# Patient Record
Sex: MALE | Race: ASIAN | NOT HISPANIC OR LATINO | URBAN - METROPOLITAN AREA
[De-identification: names, ages, dates, MRNs, and addresses within clinical notes are randomized per-mention and may not be internally consistent; named-entity substitution may affect disease eponyms.]

---

## 2018-10-02 ENCOUNTER — INPATIENT (INPATIENT)
Facility: HOSPITAL | Age: 48
LOS: 7 days | Discharge: HOME | DRG: 25 | End: 2018-10-10
Attending: NEUROLOGICAL SURGERY | Admitting: INTERNAL MEDICINE
Payer: MEDICAID

## 2018-10-02 VITALS
HEART RATE: 95 BPM | SYSTOLIC BLOOD PRESSURE: 132 MMHG | TEMPERATURE: 98 F | OXYGEN SATURATION: 98 % | DIASTOLIC BLOOD PRESSURE: 94 MMHG | RESPIRATION RATE: 20 BRPM

## 2018-10-02 DIAGNOSIS — R91.8 OTHER NONSPECIFIC ABNORMAL FINDING OF LUNG FIELD: ICD-10-CM

## 2018-10-02 LAB
ALBUMIN SERPL ELPH-MCNC: 4.3 G/DL — SIGNIFICANT CHANGE UP (ref 3.3–5)
ALP SERPL-CCNC: 93 U/L — SIGNIFICANT CHANGE UP (ref 40–120)
ALT FLD-CCNC: 22 U/L — SIGNIFICANT CHANGE UP (ref 10–45)
ANION GAP SERPL CALC-SCNC: 10 MMOL/L — SIGNIFICANT CHANGE UP (ref 5–17)
APTT BLD: 39.7 SEC — HIGH (ref 27.5–37.4)
AST SERPL-CCNC: 15 U/L — SIGNIFICANT CHANGE UP (ref 10–40)
BASOPHILS # BLD AUTO: 0.1 K/UL — SIGNIFICANT CHANGE UP (ref 0–0.2)
BASOPHILS NFR BLD AUTO: 0.6 % — SIGNIFICANT CHANGE UP (ref 0–2)
BILIRUB SERPL-MCNC: 0.3 MG/DL — SIGNIFICANT CHANGE UP (ref 0.2–1.2)
BUN SERPL-MCNC: 9 MG/DL — SIGNIFICANT CHANGE UP (ref 7–23)
CALCIUM SERPL-MCNC: 9.6 MG/DL — SIGNIFICANT CHANGE UP (ref 8.4–10.5)
CHLORIDE SERPL-SCNC: 105 MMOL/L — SIGNIFICANT CHANGE UP (ref 96–108)
CK MB BLD-MCNC: 1.7 % — SIGNIFICANT CHANGE UP (ref 0–3.5)
CK MB CFR SERPL CALC: 1.7 NG/ML — SIGNIFICANT CHANGE UP (ref 0–6.7)
CK SERPL-CCNC: 100 U/L — SIGNIFICANT CHANGE UP (ref 30–200)
CO2 SERPL-SCNC: 22 MMOL/L — SIGNIFICANT CHANGE UP (ref 22–31)
CREAT SERPL-MCNC: 0.7 MG/DL — SIGNIFICANT CHANGE UP (ref 0.5–1.3)
EOSINOPHIL # BLD AUTO: 0.3 K/UL — SIGNIFICANT CHANGE UP (ref 0–0.5)
EOSINOPHIL NFR BLD AUTO: 2.7 % — SIGNIFICANT CHANGE UP (ref 0–6)
GAS PNL BLDV: SIGNIFICANT CHANGE UP
GLUCOSE BLDC GLUCOMTR-MCNC: 194 MG/DL — HIGH (ref 70–99)
GLUCOSE SERPL-MCNC: 224 MG/DL — HIGH (ref 70–99)
HCT VFR BLD CALC: 47.9 % — SIGNIFICANT CHANGE UP (ref 39–50)
HGB BLD-MCNC: 15.5 G/DL — SIGNIFICANT CHANGE UP (ref 13–17)
INR BLD: 1.31 RATIO — HIGH (ref 0.88–1.16)
LYMPHOCYTES # BLD AUTO: 3.4 K/UL — HIGH (ref 1–3.3)
LYMPHOCYTES # BLD AUTO: 31.9 % — SIGNIFICANT CHANGE UP (ref 13–44)
MCHC RBC-ENTMCNC: 26.6 PG — LOW (ref 27–34)
MCHC RBC-ENTMCNC: 32.5 GM/DL — SIGNIFICANT CHANGE UP (ref 32–36)
MCV RBC AUTO: 81.9 FL — SIGNIFICANT CHANGE UP (ref 80–100)
MONOCYTES # BLD AUTO: 0.6 K/UL — SIGNIFICANT CHANGE UP (ref 0–0.9)
MONOCYTES NFR BLD AUTO: 5.3 % — SIGNIFICANT CHANGE UP (ref 2–14)
NEUTROPHILS # BLD AUTO: 6.4 K/UL — SIGNIFICANT CHANGE UP (ref 1.8–7.4)
NEUTROPHILS NFR BLD AUTO: 59.4 % — SIGNIFICANT CHANGE UP (ref 43–77)
NT-PROBNP SERPL-SCNC: 5 PG/ML — SIGNIFICANT CHANGE UP (ref 0–300)
PLATELET # BLD AUTO: 211 K/UL — SIGNIFICANT CHANGE UP (ref 150–400)
POTASSIUM SERPL-MCNC: 4.4 MMOL/L — SIGNIFICANT CHANGE UP (ref 3.5–5.3)
POTASSIUM SERPL-SCNC: 4.4 MMOL/L — SIGNIFICANT CHANGE UP (ref 3.5–5.3)
PROT SERPL-MCNC: 7.4 G/DL — SIGNIFICANT CHANGE UP (ref 6–8.3)
PROTHROM AB SERPL-ACNC: 14.4 SEC — HIGH (ref 9.8–12.7)
RBC # BLD: 5.85 M/UL — HIGH (ref 4.2–5.8)
RBC # FLD: 13 % — SIGNIFICANT CHANGE UP (ref 10.3–14.5)
SODIUM SERPL-SCNC: 137 MMOL/L — SIGNIFICANT CHANGE UP (ref 135–145)
TROPONIN T, HIGH SENSITIVITY RESULT: 6 NG/L — SIGNIFICANT CHANGE UP (ref 0–51)
WBC # BLD: 10.8 K/UL — HIGH (ref 3.8–10.5)
WBC # FLD AUTO: 10.8 K/UL — HIGH (ref 3.8–10.5)

## 2018-10-02 PROCEDURE — 70498 CT ANGIOGRAPHY NECK: CPT | Mod: 26

## 2018-10-02 PROCEDURE — 99285 EMERGENCY DEPT VISIT HI MDM: CPT | Mod: 25

## 2018-10-02 PROCEDURE — 74177 CT ABD & PELVIS W/CONTRAST: CPT | Mod: 26

## 2018-10-02 PROCEDURE — 71045 X-RAY EXAM CHEST 1 VIEW: CPT | Mod: 26

## 2018-10-02 PROCEDURE — 70450 CT HEAD/BRAIN W/O DYE: CPT | Mod: 26,59

## 2018-10-02 PROCEDURE — 93010 ELECTROCARDIOGRAM REPORT: CPT | Mod: 76

## 2018-10-02 PROCEDURE — 71260 CT THORAX DX C+: CPT | Mod: 26

## 2018-10-02 PROCEDURE — 70496 CT ANGIOGRAPHY HEAD: CPT | Mod: 26

## 2018-10-02 RX ORDER — LISINOPRIL 2.5 MG/1
20 TABLET ORAL DAILY
Qty: 0 | Refills: 0 | Status: DISCONTINUED | OUTPATIENT
Start: 2018-10-02 | End: 2018-10-05

## 2018-10-02 RX ORDER — GEMFIBROZIL 600 MG
600 TABLET ORAL ONCE
Qty: 0 | Refills: 0 | Status: COMPLETED | OUTPATIENT
Start: 2018-10-02 | End: 2018-10-02

## 2018-10-02 RX ADMIN — Medication 600 MILLIGRAM(S): at 22:33

## 2018-10-02 RX ADMIN — Medication 100 MILLIGRAM(S): at 22:33

## 2018-10-02 NOTE — ED ADULT NURSE REASSESSMENT NOTE - NS ED NURSE REASSESS COMMENT FT1
Patient received bed assignment. Patient aware of assignment. Report given to receiving RN. VSS. Patient stable for transport. Chart given to charge desk. Safety and comfort maintained while in ED.

## 2018-10-02 NOTE — ED PROVIDER NOTE - OBJECTIVE STATEMENT
49 yo M w/ PMHX of diabetes, HTN, HLD, and seizures diagnosed in 2007 (reports only having 1 episode) presents today after a fall today at  max while out with his family. Pt reports feeling lightheaded, falling, and losing consciousness for about 15 minutes and woke up confused as per family. The fall happened 1 hour ago. Prior to the fall pt admits to having intermittent CP, muscle pains in legs BL, severe HA which was accompanied by dizziness and blurry vision. Denies any CP, nausea, SOB, or hx of heart attacks. Finger stick done by EMS was 120. 47 yo M w/ PMHX of diabetes, HTN, HLD, and seizures diagnosed in 2007 (reports only having 1 episode) at the same time as his diabetes. Presents today after a fall today at [employer] while out with his family. Pt reports feeling lightheaded, falling, and losing consciousness for about 5-10 minutes and woke up confused and sweaty as per family. The fall happened 1 hour ago. Prior to the fall pt admits to having muscle pains in legs BL, severe HA which was accompanied by dizziness and blurry vision. Denies any CP, nausea, SOB, or hx of heart attacks. Finger stick done by EMS was 120.    Pt is somewhat slow of speech but speaking clearly. A&Ox4 with slightly depressed affect. family present and states he hasn't been himself for several days and noted a shuffling/abnormal gait which started on Saturday. Full neuro exam today, afocal. However, report of sudden onset severe HA associated with diaphoresis and syncope with reported LOC 5-10 mins concerned for subarachnoid vs seizure vs ACS, EKG and fingerstick non-actionable on arrival.

## 2018-10-02 NOTE — H&P ADULT - NSHPLABSRESULTS_GEN_ALL_CORE
LABS:                        15.5   10.8  )-----------( 211      ( 02 Oct 2018 13:44 )             47.9     10-02    137  |  105  |  9   ----------------------------<  224<H>  4.4   |  22  |  0.70    Ca    9.6      02 Oct 2018 13:44    TPro  7.4  /  Alb  4.3  /  TBili  0.3  /  DBili  x   /  AST  15  /  ALT  22  /  AlkPhos  93  10-02    PT/INR - ( 02 Oct 2018 13:44 )   PT: 14.4 sec;   INR: 1.31 ratio         PTT - ( 02 Oct 2018 13:44 )  PTT:39.7 sec        RADIOLOGY & ADDITIONAL TESTS:

## 2018-10-02 NOTE — H&P ADULT - NSHPPHYSICALEXAM_GEN_ALL_CORE
PHYSICAL EXAMINATION:  Vital Signs Last 24 Hrs  T(C): 36.8 (02 Oct 2018 19:55), Max: 36.9 (02 Oct 2018 13:07)  T(F): 98.2 (02 Oct 2018 19:55), Max: 98.4 (02 Oct 2018 13:07)  HR: 70 (02 Oct 2018 19:55) (70 - 95)  BP: 131/85 (02 Oct 2018 19:55) (131/85 - 138/88)  BP(mean): --  RR: 18 (02 Oct 2018 19:55) (16 - 20)  SpO2: 97% (02 Oct 2018 19:55) (97% - 98%)  CAPILLARY BLOOD GLUCOSE      POCT Blood Glucose.: 199 mg/dL (02 Oct 2018 13:35)      GENERAL: NAD, well-groomed, well-developed  HEAD:  atraumatic, normocephalic  EYES: sclera anicteric  ENMT: mucous membranes moist  NECK: supple, No JVD  CHEST/LUNG: clear to auscultation bilaterally; no rales, rhonchi, or wheezing b/l  HEART: normal S1, S2  ABDOMEN: BS+, soft, ND, NT   EXTREMITIES:  pulses palpable; no clubbing, cyanosis, or edema b/l LEs  NEURO: awake, alert, interactive; moves all extremities  SKIN: no rashes or lesions PHYSICAL EXAMINATION:  Vital Signs Last 24 Hrs  T(C): 36.8 (02 Oct 2018 19:55), Max: 36.9 (02 Oct 2018 13:07)  T(F): 98.2 (02 Oct 2018 19:55), Max: 98.4 (02 Oct 2018 13:07)  HR: 70 (02 Oct 2018 19:55) (70 - 95)  BP: 131/85 (02 Oct 2018 19:55) (131/85 - 138/88)  BP(mean): --  RR: 18 (02 Oct 2018 19:55) (16 - 20)  SpO2: 97% (02 Oct 2018 19:55) (97% - 98%)  CAPILLARY BLOOD GLUCOSE      POCT Blood Glucose.: 199 mg/dL (02 Oct 2018 13:35)      GENERAL: NAD, well-groomed, well-developed, comfortable on 8 [address]  HEAD:  atraumatic, normocephalic  EYES: sclera anicteric  ENMT: mucous membranes moist  NECK: supple, No JVD  CHEST/LUNG: clear to auscultation bilaterally; no rales, rhonchi, or wheezing b/l  HEART: normal S1, S2  ABDOMEN: BS+, soft, ND, NT   EXTREMITIES:  pulses palpable; no clubbing, cyanosis, or edema b/l LEs  NEURO: awake, alert, interactive; moves all extremities  SKIN: no rashes or lesions

## 2018-10-02 NOTE — ED ADULT NURSE NOTE - OBJECTIVE STATEMENT
48 y m came to the ed by ems c/o dizziness. patient states the dizziness started suddenly. denies any chest pain, sob. c/o headache. states he was worked up at another hospital one month ago and was told nothing wrong. patient is a/xo3. skin is warm and dry. denies any n/v/d.

## 2018-10-02 NOTE — ED PROVIDER NOTE - ATTENDING CONTRIBUTION TO CARE
Attending MD Arriola.  Pt seen by myself and PA in real time with scribe assistance in documentation.  Received call from radiology re: broad/significant encephalomalacea and lung nodules.  No previously known CA hx.  Radiology recommending MRI with/without.  Neurology consulted and recommending the same.  Stable for admission to medicine.

## 2018-10-02 NOTE — CONSULT NOTE ADULT - SUBJECTIVE AND OBJECTIVE BOX
Neurology Consult    Reason for consult: Encephalomalacia on CT    HPI: Patient is a 48 year old Russian male presenting s/p syncope. Patient has PMH of HTN, DM, seizures (since age 10, last seizure was 2007, on dilantin 100 QHS). Patient states he was walking at a store earlier when he felt lightheaded and passed out. Denies any urinary incontinence, jerking movements, tongue biting. States he had multiple episodes of syncope in the past. He is visiting from [facility]. Had CTH 1 year ago and states he had some scarring but does not remember the details.     REVIEW OF SYSTEMS:  Constitutional: No fever, chills, fatigue, weakness.  Eyes: No eye pain, visual disturbances, or discharge.  ENT:  No difficulty hearing, tinnitus, vertigo. No sinus or throat pain.  Neck: No pain or stiffness.  Respiratory: No cough, dyspnea, wheezing.  Cardiovascular: No chest pain, palpitations.  Gastrointestinal: No abdominal pain. No nausea, vomiting, diarrhea, or constipation.   Genitourinary: No dysuria, frequency, hematuria or incontinence.  Neurological: No headaches, lightheadedness, vertigo, numbness or tremors.  Psychiatric: No depression, anxiety, mood swings, or difficulty sleeping.  Musculoskeletal: No joint pain or swelling. No muscle, back, or extremity pain.  Skin: No itching, burning, rashes or lesions.   Lymph Nodes: No enlarged glands  Endocrine: No heat or cold intolerance, no hair loss.  Allergy and Immunologic: No hives or eczema.    MEDICATIONS  Dilantin 100 QHS    PMH:      PSH:     FAMILY HISTORY:  No history of dementia, strokes    SOCIAL HISTORY:  Smokes and drinks     Allergies  No Known Allergies    Vital Signs Last 24 Hrs  T(C): 36.9 (02 Oct 2018 16:04), Max: 36.9 (02 Oct 2018 13:07)  T(F): 98.4 (02 Oct 2018 16:04), Max: 98.4 (02 Oct 2018 13:07)  HR: 73 (02 Oct 2018 16:04) (73 - 95)  BP: 135/89 (02 Oct 2018 16:04) (132/94 - 138/88)  RR: 18 (02 Oct 2018 16:04) (16 - 20)  SpO2: 98% (02 Oct 2018 16:04) (97% - 98%)    Neurological Examination:    Mental Status: Patient is alert, awake, oriented X3. Patient is fluent, no dysarthria, no aphasia. Follows commands well and able to answer questions appropriately. Mood and affect normal.    Cranial Nerves: PERRL, EOMI, visual field intact, V1-V3 intact, mild right nasolabial flattening, tongue/uvula midline    Motor Exam: No drift  Right upper extremity: 5/5  Left upper extremity: 5/5  Right lower extremity: 5/5  Left lower extremity: 5/5    Normal bulk/tone    Sensory: Intact to light touch bilaterally. No extinction    Coordination: Finger to nose intact bilaterally     Reflexes: Bilateral 2+ Biceps, Brachial, Patellar    GENERAL: No acute distress  HEENT:  Normocephalic, atraumatic  EXTREMITIES: No edema, clubbing, cyanosis  MUSCULOSKELETAL: Normal range of motion  SKIN: No rashes    LABS:  CBC Full  -  ( 02 Oct 2018 13:44 )  WBC Count : 10.8 K/uL  Hemoglobin : 15.5 g/dL  Hematocrit : 47.9 %  Platelet Count - Automated : 211 K/uL  Mean Cell Volume : 81.9 fl  Mean Cell Hemoglobin : 26.6 pg  Mean Cell Hemoglobin Concentration : 32.5 gm/dL  Auto Neutrophil # : 6.4 K/uL  Auto Lymphocyte # : 3.4 K/uL  Auto Monocyte # : 0.6 K/uL  Auto Eosinophil # : 0.3 K/uL  Auto Basophil # : 0.1 K/uL  Auto Neutrophil % : 59.4 %  Auto Lymphocyte % : 31.9 %  Auto Monocyte % : 5.3 %  Auto Eosinophil % : 2.7 %  Auto Basophil % : 0.6 %    10-02    137  |  105  |  9   ----------------------------<  224<H>  4.4   |  22  |  0.70    Ca    9.6      02 Oct 2018 13:44    TPro  7.4  /  Alb  4.3  /  TBili  0.3  /  DBili  x   /  AST  15  /  ALT  22  /  AlkPhos  93  10-02    LIVER FUNCTIONS - ( 02 Oct 2018 13:44 )  Alb: 4.3 g/dL / Pro: 7.4 g/dL / ALK PHOS: 93 U/L / ALT: 22 U/L / AST: 15 U/L / GGT: x           Hemoglobin A1C:       PT/INR - ( 02 Oct 2018 13:44 )   PT: 14.4 sec;   INR: 1.31 ratio         PTT - ( 02 Oct 2018 13:44 )  PTT:39.7 sec

## 2018-10-02 NOTE — H&P ADULT - ASSESSMENT
49 yo M w/ PMHX of diabetes, HTN, HLD, and seizures diagnosed in 2007 (reports only having 1 episode) at the same time as his diabetes. Presents today after a fall today at [employer] while out with his family. Pt reports feeling lightheaded, falling, and losing consciousness for about 5-10 minutes and woke up confused and sweaty as per family. The fall happened 1 hour ago. Prior to the fall pt admits to having muscle pains in legs BL, severe HA which was accompanied by dizziness and blurry vision. Denies any CP, nausea, SOB, or hx of heart attacks. Finger stick done by EMS was 120.     Plan: Will order MRI of brain with and without contrast. Continue Dilantin 100 mg at night. Altace 5  MG/ day for HTN and Gemfibriozol 600 mg/day for HLD. CT chest shows no malignancy.   CT head shows encephamalacia, likely related to seizure history.     Discharge home AM if MRI brain WNL.

## 2018-10-02 NOTE — ED PROVIDER NOTE - NS_ ATTENDINGSCRIBEDETAILS _ED_A_ED_FT
Attending MD Arriola.  Agree with above.  PT seen and assessed with scribe assistance in documentation in real time.

## 2018-10-02 NOTE — ED PROVIDER NOTE - PHYSICAL EXAMINATION
GEN: no acute respiratory distress. nontoxic, speaking comfortably in full sentences, ambulating with steady gait.  HEENT: NCAT. face symmetrical. PERRL 4mm, EOMI, normal auditory canal b/l, normal TM b/l. no hemotympanum. nose midline and without discharge,  MMM, oropharynx wnl.  Neck: no JVD, trachea midline, no LAD  CV: RRR. +S1S2, no murmur. 2+ pulses in 4 extremities, cap refill <2 sec  Chest: CTA B/l. no wheezing, rales, rhonchi. no retractions. good air movement. no tenderness. no rash or ecchymosis  ABD: +BS, soft, non distended, non tender. No guarding/rebound. No lesions, ecchymosis, surgical scar  : no cva or suprapubic tenderness  MSK: No clubbing, cyanosis, edema. FROM of all extremities. no tenderness to palpation. No midline or paraspinal tenderness. no spinal step-offs.  Neuro: AOOX3.  CN 2-12 intact; Sensation intact, motor 5/5 throughout. finger-nose/heal-shin intact. no ataxia  SKIN: No erythema, lesions or rash GEN: no acute respiratory distress. nontoxic, speaking somewhat slow of speech. family reports pts gait has been shuffling/abnormal since saturday.   HEENT: NCAT. face symmetrical. PERRL 4mm, EOMI, normal auditory canal b/l, normal TM b/l. no hemotympanum. nose midline and without discharge,  MMM, oropharynx wnl.  Neck: no JVD, trachea midline, no LAD  CV: RRR. +S1S2, no murmur. 2+ pulses in 4 extremities, cap refill <2 sec  Chest: CTA B/l. no wheezing, rales, rhonchi. no retractions. good air movement. no tenderness. no rash or ecchymosis  ABD: +BS, soft, non distended, non tender. No guarding/rebound. No lesions, ecchymosis, surgical scar  : no cva or suprapubic tenderness  MSK: No clubbing, cyanosis, edema. FROM of all extremities. no tenderness to palpation. No midline or paraspinal tenderness. no spinal step-offs.  Neuro: AOOX4 with slightly depressed affect. CN 2-12 intact; Sensation intact, motor 5/5 throughout. finger-nose/heal-shin intact. no ataxia  SKIN: No erythema, lesions or rash

## 2018-10-02 NOTE — CONSULT NOTE ADULT - ASSESSMENT
48 year old Filipino male presenting s/p syncope. Patient has PMH of HTN, DM, seizures (since age 10, last seizure was 2007, on dilantin 100 QHS). Patient states he was walking at a store earlier when he felt lightheaded and passed out. Denies any urinary incontinence, jerking movements, tongue biting. States he had multiple episodes of syncope in the past. He is visiting from [facility]. Had CTH 1 year ago and states he had some scarring but does not remember the details.   Neuro exam with mild 48 year old [place] male presenting s/p syncope. Patient has PMH of HTN, DM, seizures (since age 10, last seizure was 2007, on dilantin 100 QHS). Patient states he was walking at a store earlier when he felt lightheaded and passed out. Denies any urinary incontinence, jerking movements, tongue biting. States he had multiple episodes of syncope in the past. He is visiting from [facility]. Had CTH 1 year ago and states he had some scarring but does not remember the details.   Neuro exam with mild right nasolabial flattening  CT head showed Loss of volume of the right cerebral hemisphere with multiple areas of encephalomalacia within the mesial right frontal and parietal regions as well as along the inferior right frontal lobe which is associated with coarse calcification possibly dystrophic in nature.   CTA head and neck unremarkable.      Encephalomalacia see on CT, chronic in nature    Recommend:  Continue dilantin 100 QHS for seizure disorder  Discussed importance of medication adherence  Outpatient follow up with private neurologist in [facility] 48 year old [place] male presenting s/p syncope. Patient has PMH of HTN, DM, seizures (since age 10, last seizure was 2007, on dilantin 100 QHS). Patient states he was walking at a store earlier when he felt lightheaded and passed out. Denies any urinary incontinence, jerking movements, tongue biting. States he had multiple episodes of syncope in the past. He is visiting from [facility]. Had CTH 1 year ago and states he had some scarring but does not remember the details.   Neuro exam with mild right nasolabial flattening  CT head showed Loss of volume of the right cerebral hemisphere with multiple areas of encephalomalacia within the mesial right frontal and parietal regions as well as along the inferior right frontal lobe which is associated with coarse calcification possibly dystrophic in nature.   CTA head and neck unremarkable.      Encephalomalacia see on CT, chronic in nature    Recommend:  Continue dilantin 100 QHS for seizure disorder  Discussed importance of medication adherence  MRI brain with and without contrast  Outpatient follow up with private neurologist in [facility]

## 2018-10-02 NOTE — ED PROVIDER NOTE - MEDICAL DECISION MAKING DETAILS
47 yo M 49 yo M is somewhat slow of speech but speaking clearly. A&Ox4 with slightly depressed affect. family present and states he hasn't been himself for several days and noted a shuffling/abnormal gait which started on Saturday. Full neuro exam today, afocal. However, report of sudden onset severe HA associated with diaphoresis and syncope with reported LOC 5-10 mins concerned for subarachnoid vs seizure vs ACS, EKG and fingerstick non-actionable on arrival.

## 2018-10-02 NOTE — ED ADULT NURSE REASSESSMENT NOTE - NS ED NURSE REASSESS COMMENT FT1
patient is resting in the [facility] waiting for dispo. patient states still feeling dizzy although says his symptoms have improved. denies any new complaints. vss/nad. will continue to monitor.

## 2018-10-02 NOTE — ED ADULT NURSE NOTE - NSIMPLEMENTINTERV_GEN_ALL_ED
Implemented All Universal Safety Interventions:  [place] to call system. Call bell, personal items and telephone within reach. Instruct patient to call for assistance. Room bathroom lighting operational. Non-slip footwear when patient is off stretcher. Physically safe environment: no spills, clutter or unnecessary equipment. Stretcher in lowest position, wheels locked, appropriate side rails in place.

## 2018-10-03 LAB
ANION GAP SERPL CALC-SCNC: 11 MMOL/L — SIGNIFICANT CHANGE UP (ref 5–17)
BLD GP AB SCN SERPL QL: NEGATIVE — SIGNIFICANT CHANGE UP
BUN SERPL-MCNC: 10 MG/DL — SIGNIFICANT CHANGE UP (ref 7–23)
CALCIUM SERPL-MCNC: 9.1 MG/DL — SIGNIFICANT CHANGE UP (ref 8.4–10.5)
CHLORIDE SERPL-SCNC: 103 MMOL/L — SIGNIFICANT CHANGE UP (ref 96–108)
CO2 SERPL-SCNC: 25 MMOL/L — SIGNIFICANT CHANGE UP (ref 22–31)
CREAT SERPL-MCNC: 0.72 MG/DL — SIGNIFICANT CHANGE UP (ref 0.5–1.3)
GLUCOSE BLDC GLUCOMTR-MCNC: 177 MG/DL — HIGH (ref 70–99)
GLUCOSE BLDC GLUCOMTR-MCNC: 185 MG/DL — HIGH (ref 70–99)
GLUCOSE BLDC GLUCOMTR-MCNC: 217 MG/DL — HIGH (ref 70–99)
GLUCOSE BLDC GLUCOMTR-MCNC: 228 MG/DL — HIGH (ref 70–99)
GLUCOSE BLDC GLUCOMTR-MCNC: 344 MG/DL — HIGH (ref 70–99)
GLUCOSE SERPL-MCNC: 199 MG/DL — HIGH (ref 70–99)
HCT VFR BLD CALC: 46.7 % — SIGNIFICANT CHANGE UP (ref 39–50)
HGB BLD-MCNC: 15.6 G/DL — SIGNIFICANT CHANGE UP (ref 13–17)
MCHC RBC-ENTMCNC: 26.9 PG — LOW (ref 27–34)
MCHC RBC-ENTMCNC: 33.4 GM/DL — SIGNIFICANT CHANGE UP (ref 32–36)
MCV RBC AUTO: 80.5 FL — SIGNIFICANT CHANGE UP (ref 80–100)
PLATELET # BLD AUTO: 197 K/UL — SIGNIFICANT CHANGE UP (ref 150–400)
POTASSIUM SERPL-MCNC: 4.4 MMOL/L — SIGNIFICANT CHANGE UP (ref 3.5–5.3)
POTASSIUM SERPL-SCNC: 4.4 MMOL/L — SIGNIFICANT CHANGE UP (ref 3.5–5.3)
RBC # BLD: 5.8 M/UL — SIGNIFICANT CHANGE UP (ref 4.2–5.8)
RBC # FLD: 14.5 % — SIGNIFICANT CHANGE UP (ref 10.3–14.5)
RH IG SCN BLD-IMP: POSITIVE — SIGNIFICANT CHANGE UP
RH IG SCN BLD-IMP: POSITIVE — SIGNIFICANT CHANGE UP
SODIUM SERPL-SCNC: 139 MMOL/L — SIGNIFICANT CHANGE UP (ref 135–145)
WBC # BLD: 9.3 K/UL — SIGNIFICANT CHANGE UP (ref 3.8–10.5)
WBC # FLD AUTO: 9.3 K/UL — SIGNIFICANT CHANGE UP (ref 3.8–10.5)

## 2018-10-03 PROCEDURE — 99222 1ST HOSP IP/OBS MODERATE 55: CPT

## 2018-10-03 PROCEDURE — 70553 MRI BRAIN STEM W/O & W/DYE: CPT | Mod: 26

## 2018-10-03 RX ORDER — DEXTROSE 50 % IN WATER 50 %
25 SYRINGE (ML) INTRAVENOUS ONCE
Qty: 0 | Refills: 0 | Status: DISCONTINUED | OUTPATIENT
Start: 2018-10-03 | End: 2018-10-05

## 2018-10-03 RX ORDER — DEXAMETHASONE 0.5 MG/5ML
4 ELIXIR ORAL EVERY 6 HOURS
Qty: 0 | Refills: 0 | Status: DISCONTINUED | OUTPATIENT
Start: 2018-10-03 | End: 2018-10-04

## 2018-10-03 RX ORDER — INSULIN LISPRO 100/ML
VIAL (ML) SUBCUTANEOUS AT BEDTIME
Qty: 0 | Refills: 0 | Status: DISCONTINUED | OUTPATIENT
Start: 2018-10-03 | End: 2018-10-04

## 2018-10-03 RX ORDER — INSULIN LISPRO 100/ML
VIAL (ML) SUBCUTANEOUS
Qty: 0 | Refills: 0 | Status: DISCONTINUED | OUTPATIENT
Start: 2018-10-03 | End: 2018-10-04

## 2018-10-03 RX ORDER — GLUCAGON INJECTION, SOLUTION 0.5 MG/.1ML
1 INJECTION, SOLUTION SUBCUTANEOUS ONCE
Qty: 0 | Refills: 0 | Status: DISCONTINUED | OUTPATIENT
Start: 2018-10-03 | End: 2018-10-05

## 2018-10-03 RX ORDER — LISINOPRIL 2.5 MG/1
1 TABLET ORAL
Qty: 0 | Refills: 0 | COMMUNITY
Start: 2018-10-03

## 2018-10-03 RX ORDER — SODIUM CHLORIDE 9 MG/ML
1000 INJECTION, SOLUTION INTRAVENOUS
Qty: 0 | Refills: 0 | Status: DISCONTINUED | OUTPATIENT
Start: 2018-10-03 | End: 2018-10-05

## 2018-10-03 RX ORDER — DEXTROSE 50 % IN WATER 50 %
15 SYRINGE (ML) INTRAVENOUS ONCE
Qty: 0 | Refills: 0 | Status: DISCONTINUED | OUTPATIENT
Start: 2018-10-03 | End: 2018-10-05

## 2018-10-03 RX ORDER — DEXTROSE 50 % IN WATER 50 %
12.5 SYRINGE (ML) INTRAVENOUS ONCE
Qty: 0 | Refills: 0 | Status: DISCONTINUED | OUTPATIENT
Start: 2018-10-03 | End: 2018-10-05

## 2018-10-03 RX ORDER — LEVETIRACETAM 250 MG/1
500 TABLET, FILM COATED ORAL
Qty: 0 | Refills: 0 | Status: DISCONTINUED | OUTPATIENT
Start: 2018-10-03 | End: 2018-10-05

## 2018-10-03 RX ADMIN — LEVETIRACETAM 500 MILLIGRAM(S): 250 TABLET, FILM COATED ORAL at 18:01

## 2018-10-03 RX ADMIN — Medication 4: at 21:52

## 2018-10-03 RX ADMIN — LISINOPRIL 20 MILLIGRAM(S): 2.5 TABLET ORAL at 05:15

## 2018-10-03 RX ADMIN — Medication 1: at 18:01

## 2018-10-03 RX ADMIN — Medication 4 MILLIGRAM(S): at 18:01

## 2018-10-03 NOTE — CONSULT NOTE ADULT - ASSESSMENT
48M s/p possible seizure episode found to have inferior right frontal enhancing / necrotic lesion, CT CAP no obvious primary, concern for primary CNS neoplasm.   - Will discuss case with Dr. Weldon, patient will likely require tissue diagnosis and possible resection of lesion 48M s/p possible seizure episode found to have inferior right frontal enhancing / necrotic lesion, CT CAP no obvious primary, concern for primary CNS neoplasm.     Plan:  - Will discuss case with Dr. Weldon, patient will likely require tissue diagnosis and possible resection of lesion  - Hold ASA  - Continue Keppra 1 g  - Decadron 48M s/p possible seizure episode found to have inferior right frontal enhancing / necrotic lesion, CT CAP no obvious primary, concern for primary CNS neoplasm.     Plan:  - Preoperative workup and medical clearance for possible OR on Friday 10/5 with Dr. Weldon  - Hold ASA  - Keppra or other AED as per neurology recommendations  - Decadron     Case discussed with Dr. Weldon

## 2018-10-03 NOTE — CONSULT NOTE ADULT - SUBJECTIVE AND OBJECTIVE BOX
p ([id number])     HPI:  47 yo M w/ PMHX of diabetes, HTN, HLD, and seizures diagnosed in 2007 (reports only having 1 episode) at the same time as his diabetes. Presents today after a fall today at [employer] while out with his family. Pt reports feeling lightheaded, falling, and losing consciousness for about 5-10 minutes and woke up confused and sweaty as per family. The fall happened 1 hour ago. Prior to the fall pt admits to having muscle pains in legs BL, severe HA which was accompanied by dizziness and blurry vision. Denies any CP, nausea, SOB, or hx of heart attacks. Finger stick done by EMS was 120. (02 Oct 2018 21:27)    PAST MEDICAL HISTORY     PAST SURGICAL HISTORY         MEDICATIONS:  Antibiotics:    Neuro:  phenytoin   Capsule 100 milliGRAM(s) Oral at bedtime    Anticoagulation:    Other:  lisinopril 20 milliGRAM(s) Oral daily      SOCIAL HISTORY:   Occupation:   Marital Status:     FAMILY HISTORY:      REVIEW OF SYSTEMS:  Check here if all are normal other than Neurological []  General:  Eyes:  ENT:  Cardiac:  Respiratory:  GI:  Musculoskeletal:   Skin:  Neurologic:   Psychiatric:     PHYSICAL EXAMINATION:   T(C): 36.2 (10-03-18 @ 07:48), Max: 36.9 (10-02-18 @ 13:07)  HR: 88 (10-03-18 @ 07:48) (68 - 88)  BP: 101/70 (10-03-18 @ 07:48) (101/70 - 138/88)  RR: 18 (10-03-18 @ 07:48) (16 - 18)  SpO2: 97% (10-03-18 @ 07:48) (97% - 100%)  Wt(kg): --Height (cm): 166 (10-02 @ 21:00)  Weight (kg): 64.1 (10-02 @ 21:00)    General Examination:     Neurologic Examination:           AOx3, FC, PERRL, EOMI, no facial   5/5 throughout, no drift  SILT  no clonus    LABS:                        15.6   9.30  )-----------( 197      ( 03 Oct 2018 08:43 )             46.7     10-03    139  |  103  |  10  ----------------------------<  199<H>  4.4   |  25  |  0.72    Ca    9.1      03 Oct 2018 07:21    TPro  7.4  /  Alb  4.3  /  TBili  0.3  /  DBili  x   /  AST  15  /  ALT  22  /  AlkPhos  93  10-02    PT/INR - ( 02 Oct 2018 13:44 )   PT: 14.4 sec;   INR: 1.31 ratio         PTT - ( 02 Oct 2018 13:44 )  PTT:39.7 sec      RADIOLOGY & ADDITIONAL STUDIES:      IMPRESSION:    3.4 x 2.5 x 2.0 cm heterogeneously enhancing necrotic appearing lesion in   the right inferomedial frontal lobe. Intralesional hemorrhage and   air-fluid level is noted. Mild surrounding vasogenic edema. Findings   could represent primary CNS neoplasm or metastatic disease.    No abnormal leptomeningeal enhancement.    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 This may represent the presence of chronic   ischemic changes.    Dr. Melvin discussed these findings with practitioner Marc on 10/3/2018   12:20 PM with read back.                          STEPHANIE MELVIN M.D., ATTENDING RADIOLOGIST  This document has been electronically signed. Oct  3 2018 12:20PM p ([id number])     HPI:  49 yo M w/ PMHX of diabetes, HTN, HLD, and seizures diagnosed in 2007 (reports only having 1 episode) at the same time as his diabetes. Admitted to IM yesterday after a fall at [employer] while out with his family. Pt reports feeling lightheaded, falling, and losing consciousness for about 5-10 minutes and woke up confused and sweaty as per family. Prior to the fall pt admits to having muscle pains in legs BL, severe HA which was accompanied by dizziness and blurry vision. Denies any CP, nausea, SOB, or hx of heart attacks. Finger stick done by EMS was 120. (02 Oct 2018 21:27)    PAST MEDICAL HISTORY     PAST SURGICAL HISTORY         MEDICATIONS:  Antibiotics:    Neuro:  phenytoin   Capsule 100 milliGRAM(s) Oral at bedtime    Anticoagulation:    Other:  lisinopril 20 milliGRAM(s) Oral daily      SOCIAL HISTORY:   Occupation:   Marital Status:     FAMILY HISTORY:      REVIEW OF SYSTEMS:  Check here if all are normal other than Neurological []  General:  Eyes:  ENT:  Cardiac:  Respiratory:  GI:  Musculoskeletal:   Skin:  Neurologic:   Psychiatric:     PHYSICAL EXAMINATION:   T(C): 36.2 (10-03-18 @ 07:48), Max: 36.9 (10-02-18 @ 13:07)  HR: 88 (10-03-18 @ 07:48) (68 - 88)  BP: 101/70 (10-03-18 @ 07:48) (101/70 - 138/88)  RR: 18 (10-03-18 @ 07:48) (16 - 18)  SpO2: 97% (10-03-18 @ 07:48) (97% - 100%)  Wt(kg): --Height (cm): 166 (10-02 @ 21:00)  Weight (kg): 64.1 (10-02 @ 21:00)    General Examination:     Neurologic Examination:           AOx3, FC, PERRL, EOMI, V1-3 intact, no facial, palate carlos manuel symmetric, tongue midline, shrug 5/5  5/5 throughout, no drift  SILT  No clonus or babinski      LABS:                        15.6   9.30  )-----------( 197      ( 03 Oct 2018 08:43 )             46.7     10-03    139  |  103  |  10  ----------------------------<  199<H>  4.4   |  25  |  0.72    Ca    9.1      03 Oct 2018 07:21    TPro  7.4  /  Alb  4.3  /  TBili  0.3  /  DBili  x   /  AST  15  /  ALT  22  /  AlkPhos  93  10-02    PT/INR - ( 02 Oct 2018 13:44 )   PT: 14.4 sec;   INR: 1.31 ratio         PTT - ( 02 Oct 2018 13:44 )  PTT:39.7 sec      RADIOLOGY & ADDITIONAL STUDIES:      IMPRESSION:    3.4 x 2.5 x 2.0 cm heterogeneously enhancing necrotic appearing lesion in the right inferomedial frontal lobe. Intralesional hemorrhage and air-fluid level is noted. Mild surrounding vasogenic edema. Findings could represent primary CNS neoplasm or metastatic disease. No abnormal leptomeningeal enhancement.  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 This may represent the presence of chronic ischemic changes.

## 2018-10-03 NOTE — PROGRESS NOTE ADULT - ASSESSMENT
Discharge home later today if MRI brain WNL. Continue all routine meds. 47 yo M w/ PMHX of diabetes, HTN, HLD, and seizures diagnosed in 2007 (reports only having 1 episode) at the same time as his diabetes. Presents today after a fall today at [employer] while out with his family. Pt reports feeling lightheaded, falling, and losing consciousness for about 5-10 minutes and woke up confused and sweaty as per family. The fall happened 1 hour ago. Prior to the fall pt admits to having muscle pains in legs BL, severe HA which was accompanied by dizziness and blurry vision. Denies any CP, nausea, SOB, or hx of heart attacks. Finger stick done by EMS was 120.     Plan: MRI of brain with and without contrast shows necrotic mass lesion. Continue Dilantin 100 mg at night.   Will reconsult with neurology to increase dose of antiseizure regimen. Neurosurgery was   consulted. Will likely need brain surgery. CT chest and abdomen no clear primary. Altace 5  MG/ day for HTN and Gemfibriozol 600 mg/day for HLD. CT chest shows no malignancy.   CT head shows encephamalacia, likely related to seizure history.

## 2018-10-03 NOTE — PROGRESS NOTE ADULT - SUBJECTIVE AND OBJECTIVE BOX
INTERVAL HPI/OVERNIGHT EVENTS:  Pt seen and examined at bedside.     Allergies/Intolerance: No Known Allergies      MEDICATIONS  (STANDING):  lisinopril 20 milliGRAM(s) Oral daily  phenytoin   Capsule 100 milliGRAM(s) Oral at bedtime    MEDICATIONS  (PRN):        ROS: all systems reviewed and wnl      PHYSICAL EXAMINATION:  Vital Signs Last 24 Hrs  T(C): 36.6 (02 Oct 2018 23:58), Max: 36.9 (02 Oct 2018 13:07)  T(F): 97.8 (02 Oct 2018 23:58), Max: 98.4 (02 Oct 2018 13:07)  HR: 76 (03 Oct 2018 05:13) (68 - 95)  BP: 114/84 (03 Oct 2018 05:13) (114/84 - 138/88)  BP(mean): --  RR: 18 (02 Oct 2018 23:58) (16 - 20)  SpO2: 99% (02 Oct 2018 23:58) (97% - 100%)  CAPILLARY BLOOD GLUCOSE      POCT Blood Glucose.: 194 mg/dL (02 Oct 2018 22:11)  POCT Blood Glucose.: 199 mg/dL (02 Oct 2018 13:35)      10-02 @ 07:01  -  10-03 @ 06:49  --------------------------------------------------------  IN: 600 mL / OUT: 0 mL / NET: 600 mL        GENERAL:   NECK: supple, No JVD  CHEST/LUNG: clear to auscultation bilaterally; no rales, rhonchi, or wheezing b/l  HEART: normal S1, S2  ABDOMEN: BS+, soft, ND, NT   EXTREMITIES:  pulses palpable; no clubbing, cyanosis, or edema b/l LEs  SKIN: no rashes or lesions      LABS:                        15.5   10.8  )-----------( 211      ( 02 Oct 2018 13:44 )             47.9     10-02    137  |  105  |  9   ----------------------------<  224<H>  4.4   |  22  |  0.70    Ca    9.6      02 Oct 2018 13:44    TPro  7.4  /  Alb  4.3  /  TBili  0.3  /  DBili  x   /  AST  15  /  ALT  22  /  AlkPhos  93  10-02    PT/INR - ( 02 Oct 2018 13:44 )   PT: 14.4 sec;   INR: 1.31 ratio         PTT - ( 02 Oct 2018 13:44 )  PTT:39.7 sec INTERVAL HPI/OVERNIGHT EVENTS:  Pt seen and examined at bedside.     Allergies/Intolerance: No Known Allergies      MEDICATIONS  (STANDING):  lisinopril 20 milliGRAM(s) Oral daily  phenytoin   Capsule 100 milliGRAM(s) Oral at bedtime    MEDICATIONS  (PRN):        ROS: all systems reviewed and wnl      PHYSICAL EXAMINATION:  Vital Signs Last 24 Hrs  T(C): 36.6 (02 Oct 2018 23:58), Max: 36.9 (02 Oct 2018 13:07)  T(F): 97.8 (02 Oct 2018 23:58), Max: 98.4 (02 Oct 2018 13:07)  HR: 76 (03 Oct 2018 05:13) (68 - 95)  BP: 114/84 (03 Oct 2018 05:13) (114/84 - 138/88)  BP(mean): --  RR: 18 (02 Oct 2018 23:58) (16 - 20)  SpO2: 99% (02 Oct 2018 23:58) (97% - 100%)  CAPILLARY BLOOD GLUCOSE      POCT Blood Glucose.: 194 mg/dL (02 Oct 2018 22:11)  POCT Blood Glucose.: 199 mg/dL (02 Oct 2018 13:35)      10-02 @ 07:01  -  10-03 @ 06:49  --------------------------------------------------------  IN: 600 mL / OUT: 0 mL / NET: 600 mL        GENERAL: stable in bed, comfortable, no focal neuro deficits  NECK: supple, No JVD  CHEST/LUNG: clear to auscultation bilaterally; no rales, rhonchi, or wheezing b/l  HEART: normal S1, S2  ABDOMEN: BS+, soft, ND, NT   EXTREMITIES:  pulses palpable; no clubbing, cyanosis, or edema b/l LEs  SKIN: no rashes or lesions      LABS:                        15.5   10.8  )-----------( 211      ( 02 Oct 2018 13:44 )             47.9     10-02    137  |  105  |  9   ----------------------------<  224<H>  4.4   |  22  |  0.70    Ca    9.6      02 Oct 2018 13:44    TPro  7.4  /  Alb  4.3  /  TBili  0.3  /  DBili  x   /  AST  15  /  ALT  22  /  AlkPhos  93  10-02    PT/INR - ( 02 Oct 2018 13:44 )   PT: 14.4 sec;   INR: 1.31 ratio         PTT - ( 02 Oct 2018 13:44 )  PTT:39.7 sec

## 2018-10-03 NOTE — CHART NOTE - NSCHARTNOTEFT_GEN_A_CORE
.4 x 2.5 x 2.0 cm heterogeneously enhancing necrotic appearing lesion in   the right inferomedial frontal lobe. Intralesional hemorrhage and   air-fluid level is noted. Mild surrounding vasogenic edema. Findings   could represent primary CNS neoplasm or metastatic disease.    No abnormal leptomeningeal enhancement.    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 This may represent the presence of chronic   ischemic changes--------------------------------------------------------------------   Results discussed with Dr. Benavidez, Neurosx consult called, neurology called for re-assessment.

## 2018-10-04 ENCOUNTER — TRANSCRIPTION ENCOUNTER (OUTPATIENT)
Age: 48
End: 2018-10-04

## 2018-10-04 PROBLEM — Z00.00 ENCOUNTER FOR PREVENTIVE HEALTH EXAMINATION: Status: ACTIVE | Noted: 2018-10-04

## 2018-10-04 LAB
ANION GAP SERPL CALC-SCNC: 10 MMOL/L — SIGNIFICANT CHANGE UP (ref 5–17)
ANION GAP SERPL CALC-SCNC: 14 MMOL/L — SIGNIFICANT CHANGE UP (ref 5–17)
APTT BLD: 36.2 SEC — SIGNIFICANT CHANGE UP (ref 27.5–37.4)
BUN SERPL-MCNC: 20 MG/DL — SIGNIFICANT CHANGE UP (ref 7–23)
BUN SERPL-MCNC: 20 MG/DL — SIGNIFICANT CHANGE UP (ref 7–23)
CALCIUM SERPL-MCNC: 9.4 MG/DL — SIGNIFICANT CHANGE UP (ref 8.4–10.5)
CALCIUM SERPL-MCNC: 9.8 MG/DL — SIGNIFICANT CHANGE UP (ref 8.4–10.5)
CHLORIDE SERPL-SCNC: 100 MMOL/L — SIGNIFICANT CHANGE UP (ref 96–108)
CHLORIDE SERPL-SCNC: 99 MMOL/L — SIGNIFICANT CHANGE UP (ref 96–108)
CO2 SERPL-SCNC: 20 MMOL/L — LOW (ref 22–31)
CO2 SERPL-SCNC: 23 MMOL/L — SIGNIFICANT CHANGE UP (ref 22–31)
CREAT SERPL-MCNC: 0.71 MG/DL — SIGNIFICANT CHANGE UP (ref 0.5–1.3)
CREAT SERPL-MCNC: 0.76 MG/DL — SIGNIFICANT CHANGE UP (ref 0.5–1.3)
GLUCOSE BLDC GLUCOMTR-MCNC: 297 MG/DL — HIGH (ref 70–99)
GLUCOSE BLDC GLUCOMTR-MCNC: 299 MG/DL — HIGH (ref 70–99)
GLUCOSE BLDC GLUCOMTR-MCNC: 328 MG/DL — HIGH (ref 70–99)
GLUCOSE BLDC GLUCOMTR-MCNC: 380 MG/DL — HIGH (ref 70–99)
GLUCOSE SERPL-MCNC: 296 MG/DL — HIGH (ref 70–99)
GLUCOSE SERPL-MCNC: 425 MG/DL — HIGH (ref 70–99)
HBA1C BLD-MCNC: 8 % — HIGH (ref 4–5.6)
HCT VFR BLD CALC: 47.3 % — SIGNIFICANT CHANGE UP (ref 39–50)
HGB BLD-MCNC: 15.1 G/DL — SIGNIFICANT CHANGE UP (ref 13–17)
INR BLD: 1.24 RATIO — HIGH (ref 0.88–1.16)
MCHC RBC-ENTMCNC: 26.1 PG — LOW (ref 27–34)
MCHC RBC-ENTMCNC: 31.9 GM/DL — LOW (ref 32–36)
MCV RBC AUTO: 81.8 FL — SIGNIFICANT CHANGE UP (ref 80–100)
PLATELET # BLD AUTO: 237 K/UL — SIGNIFICANT CHANGE UP (ref 150–400)
POTASSIUM SERPL-MCNC: 4.6 MMOL/L — SIGNIFICANT CHANGE UP (ref 3.5–5.3)
POTASSIUM SERPL-MCNC: 4.9 MMOL/L — SIGNIFICANT CHANGE UP (ref 3.5–5.3)
POTASSIUM SERPL-SCNC: 4.6 MMOL/L — SIGNIFICANT CHANGE UP (ref 3.5–5.3)
POTASSIUM SERPL-SCNC: 4.9 MMOL/L — SIGNIFICANT CHANGE UP (ref 3.5–5.3)
PROTHROM AB SERPL-ACNC: 14.1 SEC — HIGH (ref 10–13.1)
RBC # BLD: 5.78 M/UL — SIGNIFICANT CHANGE UP (ref 4.2–5.8)
RBC # FLD: 14 % — SIGNIFICANT CHANGE UP (ref 10.3–14.5)
SODIUM SERPL-SCNC: 132 MMOL/L — LOW (ref 135–145)
SODIUM SERPL-SCNC: 134 MMOL/L — LOW (ref 135–145)
WBC # BLD: 13.02 K/UL — HIGH (ref 3.8–10.5)
WBC # FLD AUTO: 13.02 K/UL — HIGH (ref 3.8–10.5)

## 2018-10-04 PROCEDURE — 70553 MRI BRAIN STEM W/O & W/DYE: CPT | Mod: 26

## 2018-10-04 PROCEDURE — 99255 IP/OBS CONSLTJ NEW/EST HI 80: CPT

## 2018-10-04 PROCEDURE — 93306 TTE W/DOPPLER COMPLETE: CPT | Mod: 26

## 2018-10-04 RX ORDER — INSULIN LISPRO 100/ML
VIAL (ML) SUBCUTANEOUS AT BEDTIME
Qty: 0 | Refills: 0 | Status: DISCONTINUED | OUTPATIENT
Start: 2018-10-04 | End: 2018-10-05

## 2018-10-04 RX ORDER — INSULIN GLARGINE 100 [IU]/ML
25 INJECTION, SOLUTION SUBCUTANEOUS AT BEDTIME
Qty: 0 | Refills: 0 | Status: DISCONTINUED | OUTPATIENT
Start: 2018-10-04 | End: 2018-10-05

## 2018-10-04 RX ORDER — INSULIN LISPRO 100/ML
6 VIAL (ML) SUBCUTANEOUS
Qty: 0 | Refills: 0 | Status: DISCONTINUED | OUTPATIENT
Start: 2018-10-04 | End: 2018-10-05

## 2018-10-04 RX ORDER — INSULIN LISPRO 100/ML
VIAL (ML) SUBCUTANEOUS
Qty: 0 | Refills: 0 | Status: DISCONTINUED | OUTPATIENT
Start: 2018-10-04 | End: 2018-10-05

## 2018-10-04 RX ADMIN — Medication 5: at 17:34

## 2018-10-04 RX ADMIN — LEVETIRACETAM 500 MILLIGRAM(S): 250 TABLET, FILM COATED ORAL at 17:35

## 2018-10-04 RX ADMIN — Medication 4 MILLIGRAM(S): at 00:11

## 2018-10-04 RX ADMIN — Medication 3: at 08:40

## 2018-10-04 RX ADMIN — Medication 4 MILLIGRAM(S): at 12:44

## 2018-10-04 RX ADMIN — Medication 6 UNIT(S): at 17:34

## 2018-10-04 RX ADMIN — Medication 4 MILLIGRAM(S): at 17:35

## 2018-10-04 RX ADMIN — Medication 6 UNIT(S): at 12:43

## 2018-10-04 RX ADMIN — LEVETIRACETAM 500 MILLIGRAM(S): 250 TABLET, FILM COATED ORAL at 05:30

## 2018-10-04 RX ADMIN — LISINOPRIL 20 MILLIGRAM(S): 2.5 TABLET ORAL at 05:30

## 2018-10-04 RX ADMIN — Medication 4: at 12:43

## 2018-10-04 RX ADMIN — Medication 4 MILLIGRAM(S): at 05:29

## 2018-10-04 RX ADMIN — INSULIN GLARGINE 25 UNIT(S): 100 INJECTION, SOLUTION SUBCUTANEOUS at 21:27

## 2018-10-04 RX ADMIN — Medication 2: at 21:21

## 2018-10-04 NOTE — PROGRESS NOTE ADULT - SUBJECTIVE AND OBJECTIVE BOX
INTERVAL HPI/OVERNIGHT EVENTS:  Pt seen and examined at bedside.     Allergies/Intolerance: No Known Allergies      MEDICATIONS  (STANDING):  dexamethasone  Injectable 4 milliGRAM(s) IV Push every 6 hours  dextrose 5%. 1000 milliLiter(s) (50 mL/Hr) IV Continuous <Continuous>  dextrose 50% Injectable 12.5 Gram(s) IV Push once  dextrose 50% Injectable 25 Gram(s) IV Push once  dextrose 50% Injectable 25 Gram(s) IV Push once  insulin lispro (HumaLOG) corrective regimen sliding scale   SubCutaneous three times a day before meals  insulin lispro (HumaLOG) corrective regimen sliding scale   SubCutaneous at bedtime  levETIRAcetam 500 milliGRAM(s) Oral two times a day  lisinopril 20 milliGRAM(s) Oral daily    MEDICATIONS  (PRN):  dextrose 40% Gel 15 Gram(s) Oral once PRN Blood Glucose LESS THAN 70 milliGRAM(s)/deciliter  glucagon  Injectable 1 milliGRAM(s) IntraMuscular once PRN Glucose LESS THAN 70 milligrams/deciliter        ROS: all systems reviewed and wnl      PHYSICAL EXAMINATION:  Vital Signs Last 24 Hrs  T(C): 36.5 (04 Oct 2018 07:55), Max: 36.7 (03 Oct 2018 23:52)  T(F): 97.7 (04 Oct 2018 07:55), Max: 98.1 (03 Oct 2018 23:52)  HR: 100 (04 Oct 2018 07:55) (67 - 100)  BP: 120/75 (04 Oct 2018 07:55) (109/74 - 121/75)  BP(mean): --  RR: 18 (04 Oct 2018 07:55) (18 - 18)  SpO2: 97% (04 Oct 2018 07:55) (96% - 98%)  CAPILLARY BLOOD GLUCOSE      POCT Blood Glucose.: 299 mg/dL (04 Oct 2018 08:32)  POCT Blood Glucose.: 344 mg/dL (03 Oct 2018 21:38)  POCT Blood Glucose.: 177 mg/dL (03 Oct 2018 17:59)  POCT Blood Glucose.: 228 mg/dL (03 Oct 2018 16:48)  POCT Blood Glucose.: 185 mg/dL (03 Oct 2018 12:39)      10-03 @ 07:01  -  10-04 @ 07:00  --------------------------------------------------------  IN: 720 mL / OUT: 0 mL / NET: 720 mL    10-04 @ 07:01  -  10-04 @ 10:40  --------------------------------------------------------  IN: 320 mL / OUT: 0 mL / NET: 320 mL        GENERAL:   NECK: supple, No JVD  CHEST/LUNG: clear to auscultation bilaterally; no rales, rhonchi, or wheezing b/l  HEART: normal S1, S2  ABDOMEN: BS+, soft, ND, NT   EXTREMITIES:  pulses palpable; no clubbing, cyanosis, or edema b/l LEs  SKIN: no rashes or lesions      LABS:                        15.1   13.02 )-----------( 237      ( 04 Oct 2018 09:32 )             47.3     10-04    132<L>  |  99  |  20  ----------------------------<  296<H>  4.6   |  23  |  0.71    Ca    9.4      04 Oct 2018 07:24    TPro  7.4  /  Alb  4.3  /  TBili  0.3  /  DBili  x   /  AST  15  /  ALT  22  /  AlkPhos  93  10-02    PT/INR - ( 04 Oct 2018 09:35 )   PT: 14.1 sec;   INR: 1.24 ratio         PTT - ( 04 Oct 2018 09:35 )  PTT:36.2 sec INTERVAL HPI/OVERNIGHT EVENTS:  Pt seen and examined at bedside.     Allergies/Intolerance: No Known Allergies      MEDICATIONS  (STANDING):  dexamethasone  Injectable 4 milliGRAM(s) IV Push every 6 hours  dextrose 5%. 1000 milliLiter(s) (50 mL/Hr) IV Continuous <Continuous>  dextrose 50% Injectable 12.5 Gram(s) IV Push once  dextrose 50% Injectable 25 Gram(s) IV Push once  dextrose 50% Injectable 25 Gram(s) IV Push once  insulin lispro (HumaLOG) corrective regimen sliding scale   SubCutaneous three times a day before meals  insulin lispro (HumaLOG) corrective regimen sliding scale   SubCutaneous at bedtime  levETIRAcetam 500 milliGRAM(s) Oral two times a day  lisinopril 20 milliGRAM(s) Oral daily    MEDICATIONS  (PRN):  dextrose 40% Gel 15 Gram(s) Oral once PRN Blood Glucose LESS THAN 70 milliGRAM(s)/deciliter  glucagon  Injectable 1 milliGRAM(s) IntraMuscular once PRN Glucose LESS THAN 70 milligrams/deciliter        ROS: all systems reviewed and wnl      PHYSICAL EXAMINATION:  Vital Signs Last 24 Hrs  T(C): 36.5 (04 Oct 2018 07:55), Max: 36.7 (03 Oct 2018 23:52)  T(F): 97.7 (04 Oct 2018 07:55), Max: 98.1 (03 Oct 2018 23:52)  HR: 100 (04 Oct 2018 07:55) (67 - 100)  BP: 120/75 (04 Oct 2018 07:55) (109/74 - 121/75)  BP(mean): --  RR: 18 (04 Oct 2018 07:55) (18 - 18)  SpO2: 97% (04 Oct 2018 07:55) (96% - 98%)  CAPILLARY BLOOD GLUCOSE      POCT Blood Glucose.: 299 mg/dL (04 Oct 2018 08:32)  POCT Blood Glucose.: 344 mg/dL (03 Oct 2018 21:38)  POCT Blood Glucose.: 177 mg/dL (03 Oct 2018 17:59)  POCT Blood Glucose.: 228 mg/dL (03 Oct 2018 16:48)  POCT Blood Glucose.: 185 mg/dL (03 Oct 2018 12:39)      10-03 @ 07:01  -  10-04 @ 07:00  --------------------------------------------------------  IN: 720 mL / OUT: 0 mL / NET: 720 mL    10-04 @ 07:01  -  10-04 @ 10:40  --------------------------------------------------------  IN: 320 mL / OUT: 0 mL / NET: 320 mL        GENERAL: stable, alert, speech fluent, no motor deficits  NECK: supple, No JVD  CHEST/LUNG: clear to auscultation bilaterally; no rales, rhonchi, or wheezing b/l  HEART: normal S1, S2  ABDOMEN: BS+, soft, ND, NT   EXTREMITIES:  pulses palpable; no clubbing, cyanosis, or edema b/l LEs  SKIN: no rashes or lesions      LABS:                        15.1   13.02 )-----------( 237      ( 04 Oct 2018 09:32 )             47.3     10-04    132<L>  |  99  |  20  ----------------------------<  296<H>  4.6   |  23  |  0.71    Ca    9.4      04 Oct 2018 07:24    TPro  7.4  /  Alb  4.3  /  TBili  0.3  /  DBili  x   /  AST  15  /  ALT  22  /  AlkPhos  93  10-02    PT/INR - ( 04 Oct 2018 09:35 )   PT: 14.1 sec;   INR: 1.24 ratio         PTT - ( 04 Oct 2018 09:35 )  PTT:36.2 sec

## 2018-10-04 NOTE — PROGRESS NOTE ADULT - ASSESSMENT
48M s/p possible seizure episode found to have inferior right frontal enhancing / necrotic lesion, CT CAP no obvious primary, concern for primary CNS neoplasm.     Plan:  - Preoperative workup and medical clearance for possible OR on Friday 10/5 with Dr. Weldon (Please document medical clearance in the chart)  - Hold ASA  - Keppra or other AED as per neurology recommendations  - Decadron     Case discussed with Dr. Weldon

## 2018-10-04 NOTE — CHART NOTE - NSCHARTNOTEFT_GEN_A_CORE
Patient is scheduled for intracranial lesion resection tomorrow as per   neuro surgery.   No significant cardiac history  2 D echo from today revealed normal LVF with EF of 76%.  Discussed the echo result with Dr Benavidez.  Patient is medically optimized for the planned procedure   as per Dr Benavidez.  Orly Rangel NP  [phone]

## 2018-10-04 NOTE — CHART NOTE - NSCHARTNOTEFT_GEN_A_CORE
Repeat BMP as per Nsx; NA: 134 Glucose: 425. As per pt; he ate before the blood was drawn. Pt was on decadron which was discontinued today. Pt is due for 25 units of lantus at bedtime. D/w NSx; increase sliding scale and repeat bmp in 6 hrs. Will check BMP at 1:00 am.    Ashanti QUEVEDO  #[id number]    ADDENDUM   Repeat BMP  10-05    132<L>  |  100  |  18  ----------------------------<  294<H>  4.7   |  22  |  0.75    Ca    9.4      05 Oct 2018 01:46      D/w NSx; Repeat BMP as per Nsx; NA: 134 Glucose: 425. As per pt; he ate before the blood was drawn. Pt was on decadron which was discontinued today. Pt is due for 25 units of lantus at bedtime. D/w NSx; increase sliding scale and repeat bmp in 6 hrs. Will check BMP at 1:00 am.    Ashanti QUEVEDO  #[id number]    ADDENDUM   Repeat BMP  10-05    132<L>  |  100  |  18  ----------------------------<  294<H>  4.7   |  22  |  0.75    Ca    9.4      05 Oct 2018 01:46      D/w NSx; recommended coverage with humalog. Repeat FS: 247 and 4 units of humalog given as per sliding scale. Will continue to monitor    Ashanti QUEVEDO  #[id number]

## 2018-10-04 NOTE — PROGRESS NOTE ADULT - ASSESSMENT
47 yo M w/ PMHX of diabetes, HTN, HLD, and seizures diagnosed in 2007 (reports only having 1 episode) at the same time as his diabetes. Presents today after a fall today at [employer] while out with his family. Pt reports feeling lightheaded, falling, and losing consciousness for about 5-10 minutes and woke up confused and sweaty as per family. The fall happened 1 hour ago. Prior to the fall pt admits to having muscle pains in legs BL, severe HA which was accompanied by dizziness and blurry vision. Denies any CP, nausea, SOB, or hx of heart attacks. Finger stick done by EMS was 120.     Plan: MRI of brain with and without contrast shows necrotic mass lesion. Agree Kepra 500 mg bid.    Neurosurgery was consulted and plan brain surgery tomorrow. CT chest and abdomen no clear primary. Altace 5  MG/ day for HTN and Gemfibriozol 600 mg/day for HLD. CT chest shows no malignancy.   CT head shows encephamalacia,. TTE ordered for pre-op assesment.   No history of heart conditions, no MI or prior stents. Add Lantus and   humolog for BS control while on Decadron.     Patient is stable for OR Friday after TTE complete. EKG at baseline is NSR.

## 2018-10-04 NOTE — PROGRESS NOTE ADULT - SUBJECTIVE AND OBJECTIVE BOX
Patient seen and examined at bedside.    T(C): 36.7 (10-03-18 @ 23:52), Max: 36.7 (10-03-18 @ 23:52)  HR: 85 (10-03-18 @ 23:52) (67 - 88)  BP: 121/75 (10-03-18 @ 23:52) (101/70 - 121/75)  RR: 18 (10-03-18 @ 23:52) (18 - 18)  SpO2: 98% (10-03-18 @ 23:52) (96% - 98%)  Wt(kg): --    Exam:    AOx3, FC, PERRL, EOMI, V1-3 intact, no facial, palate carlos manuel symmetric, tongue midline, shrug 5/5  5/5 throughout, no drift  SILT  No clonus or babinski

## 2018-10-05 ENCOUNTER — APPOINTMENT (OUTPATIENT)
Dept: NEUROSURGERY | Facility: CLINIC | Age: 48
End: 2018-10-05

## 2018-10-05 ENCOUNTER — RESULT REVIEW (OUTPATIENT)
Age: 48
End: 2018-10-05

## 2018-10-05 LAB
ANION GAP SERPL CALC-SCNC: 10 MMOL/L — SIGNIFICANT CHANGE UP (ref 5–17)
ANION GAP SERPL CALC-SCNC: 11 MMOL/L — SIGNIFICANT CHANGE UP (ref 5–17)
ANION GAP SERPL CALC-SCNC: 12 MMOL/L — SIGNIFICANT CHANGE UP (ref 5–17)
APTT BLD: 34.1 SEC — SIGNIFICANT CHANGE UP (ref 27.5–37.4)
BUN SERPL-MCNC: 12 MG/DL — SIGNIFICANT CHANGE UP (ref 7–23)
BUN SERPL-MCNC: 16 MG/DL — SIGNIFICANT CHANGE UP (ref 7–23)
BUN SERPL-MCNC: 18 MG/DL — SIGNIFICANT CHANGE UP (ref 7–23)
CALCIUM SERPL-MCNC: 8.6 MG/DL — SIGNIFICANT CHANGE UP (ref 8.4–10.5)
CALCIUM SERPL-MCNC: 9.4 MG/DL — SIGNIFICANT CHANGE UP (ref 8.4–10.5)
CALCIUM SERPL-MCNC: 9.5 MG/DL — SIGNIFICANT CHANGE UP (ref 8.4–10.5)
CHLORIDE SERPL-SCNC: 100 MMOL/L — SIGNIFICANT CHANGE UP (ref 96–108)
CHLORIDE SERPL-SCNC: 101 MMOL/L — SIGNIFICANT CHANGE UP (ref 96–108)
CHLORIDE SERPL-SCNC: 104 MMOL/L — SIGNIFICANT CHANGE UP (ref 96–108)
CO2 SERPL-SCNC: 22 MMOL/L — SIGNIFICANT CHANGE UP (ref 22–31)
CREAT SERPL-MCNC: 0.58 MG/DL — SIGNIFICANT CHANGE UP (ref 0.5–1.3)
CREAT SERPL-MCNC: 0.67 MG/DL — SIGNIFICANT CHANGE UP (ref 0.5–1.3)
CREAT SERPL-MCNC: 0.75 MG/DL — SIGNIFICANT CHANGE UP (ref 0.5–1.3)
GLUCOSE BLDC GLUCOMTR-MCNC: 204 MG/DL — HIGH (ref 70–99)
GLUCOSE BLDC GLUCOMTR-MCNC: 209 MG/DL — HIGH (ref 70–99)
GLUCOSE BLDC GLUCOMTR-MCNC: 247 MG/DL — HIGH (ref 70–99)
GLUCOSE BLDC GLUCOMTR-MCNC: 288 MG/DL — HIGH (ref 70–99)
GLUCOSE SERPL-MCNC: 251 MG/DL — HIGH (ref 70–99)
GLUCOSE SERPL-MCNC: 253 MG/DL — HIGH (ref 70–99)
GLUCOSE SERPL-MCNC: 294 MG/DL — HIGH (ref 70–99)
HCT VFR BLD CALC: 40.3 % — SIGNIFICANT CHANGE UP (ref 39–50)
HCT VFR BLD CALC: 45.3 % — SIGNIFICANT CHANGE UP (ref 39–50)
HGB BLD-MCNC: 12.7 G/DL — LOW (ref 13–17)
HGB BLD-MCNC: 14.6 G/DL — SIGNIFICANT CHANGE UP (ref 13–17)
INR BLD: 1.32 RATIO — HIGH (ref 0.88–1.16)
MAGNESIUM SERPL-MCNC: 2 MG/DL — SIGNIFICANT CHANGE UP (ref 1.6–2.6)
MCHC RBC-ENTMCNC: 25.5 PG — LOW (ref 27–34)
MCHC RBC-ENTMCNC: 26.2 PG — LOW (ref 27–34)
MCHC RBC-ENTMCNC: 31.4 GM/DL — LOW (ref 32–36)
MCHC RBC-ENTMCNC: 32.2 GM/DL — SIGNIFICANT CHANGE UP (ref 32–36)
MCV RBC AUTO: 81.1 FL — SIGNIFICANT CHANGE UP (ref 80–100)
MCV RBC AUTO: 81.4 FL — SIGNIFICANT CHANGE UP (ref 80–100)
PHOSPHATE SERPL-MCNC: 3.8 MG/DL — SIGNIFICANT CHANGE UP (ref 2.5–4.5)
PLATELET # BLD AUTO: 214 K/UL — SIGNIFICANT CHANGE UP (ref 150–400)
PLATELET # BLD AUTO: 221 K/UL — SIGNIFICANT CHANGE UP (ref 150–400)
POTASSIUM SERPL-MCNC: 4.4 MMOL/L — SIGNIFICANT CHANGE UP (ref 3.5–5.3)
POTASSIUM SERPL-MCNC: 4.5 MMOL/L — SIGNIFICANT CHANGE UP (ref 3.5–5.3)
POTASSIUM SERPL-MCNC: 4.7 MMOL/L — SIGNIFICANT CHANGE UP (ref 3.5–5.3)
POTASSIUM SERPL-SCNC: 4.4 MMOL/L — SIGNIFICANT CHANGE UP (ref 3.5–5.3)
POTASSIUM SERPL-SCNC: 4.5 MMOL/L — SIGNIFICANT CHANGE UP (ref 3.5–5.3)
POTASSIUM SERPL-SCNC: 4.7 MMOL/L — SIGNIFICANT CHANGE UP (ref 3.5–5.3)
PROTHROM AB SERPL-ACNC: 14.3 SEC — HIGH (ref 9.8–12.7)
RBC # BLD: 4.97 M/UL — SIGNIFICANT CHANGE UP (ref 4.2–5.8)
RBC # BLD: 5.57 M/UL — SIGNIFICANT CHANGE UP (ref 4.2–5.8)
RBC # FLD: 13.1 % — SIGNIFICANT CHANGE UP (ref 10.3–14.5)
RBC # FLD: 13.1 % — SIGNIFICANT CHANGE UP (ref 10.3–14.5)
SODIUM SERPL-SCNC: 132 MMOL/L — LOW (ref 135–145)
SODIUM SERPL-SCNC: 135 MMOL/L — SIGNIFICANT CHANGE UP (ref 135–145)
SODIUM SERPL-SCNC: 137 MMOL/L — SIGNIFICANT CHANGE UP (ref 135–145)
WBC # BLD: 22.8 K/UL — HIGH (ref 3.8–10.5)
WBC # BLD: 25.2 K/UL — HIGH (ref 3.8–10.5)
WBC # FLD AUTO: 22.8 K/UL — HIGH (ref 3.8–10.5)
WBC # FLD AUTO: 25.2 K/UL — HIGH (ref 3.8–10.5)

## 2018-10-05 PROCEDURE — 61781 SCAN PROC CRANIAL INTRA: CPT

## 2018-10-05 PROCEDURE — 88307 TISSUE EXAM BY PATHOLOGIST: CPT | Mod: 26

## 2018-10-05 PROCEDURE — 61781 SCAN PROC CRANIAL INTRA: CPT | Mod: AS

## 2018-10-05 PROCEDURE — 88300 SURGICAL PATH GROSS: CPT | Mod: 26,59

## 2018-10-05 PROCEDURE — 88341 IMHCHEM/IMCYTCHM EA ADD ANTB: CPT | Mod: 26,59

## 2018-10-05 PROCEDURE — 88342 IMHCHEM/IMCYTCHM 1ST ANTB: CPT | Mod: 26,59

## 2018-10-05 PROCEDURE — 69990 MICROSURGERY ADD-ON: CPT | Mod: 59

## 2018-10-05 PROCEDURE — 61510 CRNEC TREPH EXC BRN TUM STTL: CPT

## 2018-10-05 PROCEDURE — 99291 CRITICAL CARE FIRST HOUR: CPT

## 2018-10-05 PROCEDURE — 88360 TUMOR IMMUNOHISTOCHEM/MANUAL: CPT | Mod: 26

## 2018-10-05 PROCEDURE — 88334 PATH CONSLTJ SURG CYTO XM EA: CPT | Mod: 26,59

## 2018-10-05 PROCEDURE — 88331 PATH CONSLTJ SURG 1 BLK 1SPC: CPT | Mod: 26

## 2018-10-05 PROCEDURE — 61510 CRNEC TREPH EXC BRN TUM STTL: CPT | Mod: AS

## 2018-10-05 PROCEDURE — 69990 MICROSURGERY ADD-ON: CPT | Mod: AS,59

## 2018-10-05 RX ORDER — OXYCODONE AND ACETAMINOPHEN 5; 325 MG/1; MG/1
1 TABLET ORAL EVERY 4 HOURS
Qty: 0 | Refills: 0 | Status: DISCONTINUED | OUTPATIENT
Start: 2018-10-05 | End: 2018-10-10

## 2018-10-05 RX ORDER — DOCUSATE SODIUM 100 MG
100 CAPSULE ORAL THREE TIMES A DAY
Qty: 0 | Refills: 0 | Status: DISCONTINUED | OUTPATIENT
Start: 2018-10-05 | End: 2018-10-10

## 2018-10-05 RX ORDER — INSULIN HUMAN 100 [IU]/ML
1 INJECTION, SOLUTION SUBCUTANEOUS
Qty: 100 | Refills: 0 | Status: DISCONTINUED | OUTPATIENT
Start: 2018-10-05 | End: 2018-10-05

## 2018-10-05 RX ORDER — INSULIN LISPRO 100/ML
VIAL (ML) SUBCUTANEOUS
Qty: 0 | Refills: 0 | Status: DISCONTINUED | OUTPATIENT
Start: 2018-10-05 | End: 2018-10-10

## 2018-10-05 RX ORDER — INSULIN LISPRO 100/ML
VIAL (ML) SUBCUTANEOUS EVERY 6 HOURS
Qty: 0 | Refills: 0 | Status: DISCONTINUED | OUTPATIENT
Start: 2018-10-05 | End: 2018-10-05

## 2018-10-05 RX ORDER — ONDANSETRON 8 MG/1
4 TABLET, FILM COATED ORAL ONCE
Qty: 0 | Refills: 0 | Status: DISCONTINUED | OUTPATIENT
Start: 2018-10-05 | End: 2018-10-06

## 2018-10-05 RX ORDER — DEXTROSE MONOHYDRATE, SODIUM CHLORIDE, AND POTASSIUM CHLORIDE 50; .745; 4.5 G/1000ML; G/1000ML; G/1000ML
1000 INJECTION, SOLUTION INTRAVENOUS
Qty: 0 | Refills: 0 | Status: DISCONTINUED | OUTPATIENT
Start: 2018-10-05 | End: 2018-10-06

## 2018-10-05 RX ORDER — INSULIN GLARGINE 100 [IU]/ML
24 INJECTION, SOLUTION SUBCUTANEOUS AT BEDTIME
Qty: 0 | Refills: 0 | Status: DISCONTINUED | OUTPATIENT
Start: 2018-10-05 | End: 2018-10-09

## 2018-10-05 RX ORDER — CEFAZOLIN SODIUM 1 G
1000 VIAL (EA) INJECTION EVERY 8 HOURS
Qty: 0 | Refills: 0 | Status: COMPLETED | OUTPATIENT
Start: 2018-10-05 | End: 2018-10-06

## 2018-10-05 RX ORDER — DEXAMETHASONE 0.5 MG/5ML
4 ELIXIR ORAL EVERY 6 HOURS
Qty: 0 | Refills: 0 | Status: DISCONTINUED | OUTPATIENT
Start: 2018-10-05 | End: 2018-10-05

## 2018-10-05 RX ORDER — INSULIN GLARGINE 100 [IU]/ML
12 INJECTION, SOLUTION SUBCUTANEOUS AT BEDTIME
Qty: 0 | Refills: 0 | Status: DISCONTINUED | OUTPATIENT
Start: 2018-10-05 | End: 2018-10-05

## 2018-10-05 RX ORDER — HYDROMORPHONE HYDROCHLORIDE 2 MG/ML
0.5 INJECTION INTRAMUSCULAR; INTRAVENOUS; SUBCUTANEOUS
Qty: 0 | Refills: 0 | Status: DISCONTINUED | OUTPATIENT
Start: 2018-10-05 | End: 2018-10-06

## 2018-10-05 RX ORDER — DEXAMETHASONE 0.5 MG/5ML
4 ELIXIR ORAL EVERY 6 HOURS
Qty: 0 | Refills: 0 | Status: DISCONTINUED | OUTPATIENT
Start: 2018-10-05 | End: 2018-10-06

## 2018-10-05 RX ORDER — ACETAMINOPHEN 500 MG
650 TABLET ORAL EVERY 6 HOURS
Qty: 0 | Refills: 0 | Status: DISCONTINUED | OUTPATIENT
Start: 2018-10-05 | End: 2018-10-10

## 2018-10-05 RX ORDER — LEVETIRACETAM 250 MG/1
500 TABLET, FILM COATED ORAL
Qty: 0 | Refills: 0 | Status: DISCONTINUED | OUTPATIENT
Start: 2018-10-05 | End: 2018-10-10

## 2018-10-05 RX ORDER — SENNA PLUS 8.6 MG/1
2 TABLET ORAL AT BEDTIME
Qty: 0 | Refills: 0 | Status: DISCONTINUED | OUTPATIENT
Start: 2018-10-05 | End: 2018-10-08

## 2018-10-05 RX ORDER — INSULIN LISPRO 100/ML
8 VIAL (ML) SUBCUTANEOUS
Qty: 0 | Refills: 0 | Status: DISCONTINUED | OUTPATIENT
Start: 2018-10-05 | End: 2018-10-09

## 2018-10-05 RX ORDER — LISINOPRIL 2.5 MG/1
20 TABLET ORAL DAILY
Qty: 0 | Refills: 0 | Status: DISCONTINUED | OUTPATIENT
Start: 2018-10-05 | End: 2018-10-10

## 2018-10-05 RX ADMIN — HYDROMORPHONE HYDROCHLORIDE 0.5 MILLIGRAM(S): 2 INJECTION INTRAMUSCULAR; INTRAVENOUS; SUBCUTANEOUS at 14:10

## 2018-10-05 RX ADMIN — Medication 4: at 21:45

## 2018-10-05 RX ADMIN — DEXTROSE MONOHYDRATE, SODIUM CHLORIDE, AND POTASSIUM CHLORIDE 75 MILLILITER(S): 50; .745; 4.5 INJECTION, SOLUTION INTRAVENOUS at 13:44

## 2018-10-05 RX ADMIN — LISINOPRIL 20 MILLIGRAM(S): 2.5 TABLET ORAL at 05:26

## 2018-10-05 RX ADMIN — INSULIN GLARGINE 24 UNIT(S): 100 INJECTION, SOLUTION SUBCUTANEOUS at 19:28

## 2018-10-05 RX ADMIN — Medication 6: at 16:05

## 2018-10-05 RX ADMIN — HYDROMORPHONE HYDROCHLORIDE 0.5 MILLIGRAM(S): 2 INJECTION INTRAMUSCULAR; INTRAVENOUS; SUBCUTANEOUS at 14:21

## 2018-10-05 RX ADMIN — LISINOPRIL 20 MILLIGRAM(S): 2.5 TABLET ORAL at 18:45

## 2018-10-05 RX ADMIN — Medication 100 MILLIGRAM(S): at 13:44

## 2018-10-05 RX ADMIN — Medication 100 MILLIGRAM(S): at 22:00

## 2018-10-05 RX ADMIN — Medication 102 MILLIGRAM(S): at 18:44

## 2018-10-05 RX ADMIN — LEVETIRACETAM 500 MILLIGRAM(S): 250 TABLET, FILM COATED ORAL at 18:45

## 2018-10-05 RX ADMIN — LEVETIRACETAM 500 MILLIGRAM(S): 250 TABLET, FILM COATED ORAL at 05:26

## 2018-10-05 RX ADMIN — Medication 4: at 03:21

## 2018-10-05 RX ADMIN — Medication 100 MILLIGRAM(S): at 21:44

## 2018-10-05 NOTE — PROGRESS NOTE ADULT - ASSESSMENT
Summary: 47 yo M s/p Rt Craniectomy for Rsxn of brain mass    NEURO:  q1h neuro checks  On Dexamethasone 4q6  Hx of seizure - Keppra 500 BID  Pain control w/ Dilaudid, Percocet and Tylenol prn    CARDS:   - 160  HTN - Continue home medication of lisinopril 20 mg daily    PULM:  sat > 92%    RENAL:  On NS w/ KCl @ 75ml/hr    GASTRO:  advance as tolerated  ---> Stress ulcer prophylaxis:  Not indicated    HEME:    Leucocytosis noted; afebrile. Will monitor for infectious symptoms  ---> DVT prophylaxis: SCDs, hold anticoagulation since fresh post-op    ENDO:    DM; elevated BG - will start insulin gtt    ID:  afebrile  Celine-op abx    Code status:  Full code  Disposition:  ICU    This patient was at high risk of neurologic deterioration and/or death due to: post-op hemorrhage, swelling, edema    Time spent:  45 minutes Summary: 47 yo M s/p Rt Craniectomy for Rsxn of brain mass    NEURO:  q1h neuro checks  On Dexamethasone 4q6  Hx of seizure - Keppra 500 BID  Pain control w/ Dilaudid, Percocet and Tylenol prn    CARDS:   - 160  HTN - Continue home medication of lisinopril 20 mg daily    PULM:  sat > 92%    RENAL:  On NS w/ KCl @ 75ml/hr    GASTRO:  advance as tolerated  ---> Stress ulcer prophylaxis:  Not indicated    HEME:    Leucocytosis noted; afebrile. Will monitor for infectious symptoms  ---> DVT prophylaxis: SCDs, hold anticoagulation since fresh post-op    ENDO:    DM; elevated BG - A1C 8.0  Lantus 24 units now, 8units premeal tid, medium sliding scale    ID:  afebrile  Celine-op abx    Code status:  Full code  Disposition:  ICU    This patient was at high risk of neurologic deterioration and/or death due to: post-op hemorrhage, cerebral edema, brain compression    Time spent:  45 minutes

## 2018-10-05 NOTE — PROGRESS NOTE ADULT - SUBJECTIVE AND OBJECTIVE BOX
Pt seen and examined on evening rounds. no complaints  Exam-a/ox3; fluent; FC  pupils=  EOMI face=  no drift  Plan-CT brain in am; cont to monitor closely overnight in PACU

## 2018-10-05 NOTE — BRIEF OPERATIVE NOTE - PROCEDURE
<<-----Click on this checkbox to enter Procedure Craniectomy  10/05/2018  Rt Craniectomy for Rsxn BT using synaptive navigation  Active  [facility]EDWARDS

## 2018-10-05 NOTE — PROGRESS NOTE ADULT - SUBJECTIVE AND OBJECTIVE BOX
HPI:  49 yo M w/ PMHX of diabetes, HTN, HLD, and seizures diagnosed in 2007 (reports only having 1 episode) at the same time as his diabetes. Presents today after a fall today at  max while out with his family. Pt reports feeling lightheaded, falling, and losing consciousness for about 5-10 minutes and woke up confused and sweaty as per family. The fall happened 1 hour ago. Prior to the fall pt admits to having muscle pains in legs BL, severe HA which was accompanied by dizziness and blurry vision. Denies any CP, nausea, SOB, or hx of heart attacks. Finger stick done by EMS was 120. (02 Oct 2018 21:27)    On admission, the patient was:  GCS: 15  Hunt-Oswald:  modified Foster:  ICH score:  NIHSS:    VITALS:  T(C): , Max: 36.9 (10-05-18 @ 05:25)  HR:  (65 - 109)  BP:  (100/59 - 134/81)  ABP:  (110/53 - 135/71)  RR:  (11 - 18)  SpO2:  (97% - 100%)  Wt(kg): --      10-04-18 @ 07:01  -  10-05-18 @ 07:00  --------------------------------------------------------  IN: 920 mL / OUT: 0 mL / NET: 920 mL    10-05-18 @ 07:01  -  10-05-18 @ 17:28  --------------------------------------------------------  IN: 300 mL / OUT: 840 mL / NET: -540 mL      LABS:  Na: 135 (10-05 @ 07:30), 132 (10-05 @ 01:46), 134 (10-04 @ 18:40), 132 (10-04 @ 07:24), 139 (10-03 @ 07:21)  K: 4.4 (10-05 @ 07:30), 4.7 (10-05 @ 01:46), 4.9 (10-04 @ 18:40), 4.6 (10-04 @ 07:24), 4.4 (10-03 @ 07:21)  Cl: 101 (10-05 @ 07:30), 100 (10-05 @ 01:46), 100 (10-04 @ 18:40), 99 (10-04 @ 07:24), 103 (10-03 @ 07:21)  CO2: 22 (10-05 @ 07:30), 22 (10-05 @ 01:46), 20 (10-04 @ 18:40), 23 (10-04 @ 07:24), 25 (10-03 @ 07:21)  BUN: 16 (10-05 @ 07:30), 18 (10-05 @ 01:46), 20 (10-04 @ 18:40), 20 (10-04 @ 07:24), 10 (10-03 @ 07:21)  Cr: 0.67 (10-05 @ 07:30), 0.75 (10-05 @ 01:46), 0.76 (10-04 @ 18:40), 0.71 (10-04 @ 07:24), 0.72 (10-03 @ 07:21)  Glu: 251(10-05 @ 07:30), 294(10-05 @ 01:46), 425(10-04 @ 18:40), 296(10-04 @ 07:24), 199(10-03 @ 07:21)    Hgb: 14.6 (10-05 @ 07:31), 15.1 (10-04 @ 09:32), 15.6 (10-03 @ 08:43)  Hct: 45.3 (10-05 @ 07:31), 47.3 (10-04 @ 09:32), 46.7 (10-03 @ 08:43)  WBC: 22.8 (10-05 @ 07:31), 13.02 (10-04 @ 09:32), 9.30 (10-03 @ 08:43)  Plt: 221 (10-05 @ 07:31), 237 (10-04 @ 09:32), 197 (10-03 @ 08:43)  PT: 14.3 (10-05 @ 07:23)  INR: 1.32 (10-05 @ 07:23)  aPTT: 34.1 (10-05 @ 07:23)    IMAGING:   Recent imaging studies were reviewed.    MEDICATIONS:  acetaminophen   Tablet .. 650 milliGRAM(s) Oral every 6 hours PRN  ceFAZolin   IVPB 1000 milliGRAM(s) IV Intermittent every 8 hours  dexamethasone  IVPB 4 milliGRAM(s) IV Intermittent every 6 hours  docusate sodium 100 milliGRAM(s) Oral three times a day  HYDROmorphone  Injectable 0.5 milliGRAM(s) IV Push every 10 minutes PRN  insulin lispro (HumaLOG) corrective regimen sliding scale   SubCutaneous Before meals and at bedtime  levETIRAcetam 500 milliGRAM(s) Oral two times a day  lisinopril 20 milliGRAM(s) Oral daily  ondansetron Injectable 4 milliGRAM(s) IV Push once PRN  oxyCODONE    5 mG/acetaminophen 325 mG 1 Tablet(s) Oral every 4 hours PRN  senna 2 Tablet(s) Oral at bedtime PRN  sodium chloride 0.9% with potassium chloride 20 mEq/L 1000 milliLiter(s) IV Continuous <Continuous>    EXAMINATION:  General:  calm  HEENT:  MMM  Neuro:  awake, alert, oriented x 3, follows commands, 5/5 strength b/l UE and b/l LE.  Cards:  RRR  Respiratory:  no respiratory distress  Adomen:  soft  Extremities:  no edema  Skin:  warm/dry

## 2018-10-06 LAB
ALBUMIN SERPL ELPH-MCNC: 3.7 G/DL — SIGNIFICANT CHANGE UP (ref 3.3–5)
ALP SERPL-CCNC: 69 U/L — SIGNIFICANT CHANGE UP (ref 40–120)
ALT FLD-CCNC: 17 U/L — SIGNIFICANT CHANGE UP (ref 10–45)
ANION GAP SERPL CALC-SCNC: 12 MMOL/L — SIGNIFICANT CHANGE UP (ref 5–17)
AST SERPL-CCNC: 26 U/L — SIGNIFICANT CHANGE UP (ref 10–40)
BILIRUB SERPL-MCNC: 0.5 MG/DL — SIGNIFICANT CHANGE UP (ref 0.2–1.2)
BUN SERPL-MCNC: 11 MG/DL — SIGNIFICANT CHANGE UP (ref 7–23)
CALCIUM SERPL-MCNC: 9.1 MG/DL — SIGNIFICANT CHANGE UP (ref 8.4–10.5)
CHLORIDE SERPL-SCNC: 106 MMOL/L — SIGNIFICANT CHANGE UP (ref 96–108)
CO2 SERPL-SCNC: 23 MMOL/L — SIGNIFICANT CHANGE UP (ref 22–31)
CREAT SERPL-MCNC: 0.54 MG/DL — SIGNIFICANT CHANGE UP (ref 0.5–1.3)
GLUCOSE BLDC GLUCOMTR-MCNC: 180 MG/DL — HIGH (ref 70–99)
GLUCOSE BLDC GLUCOMTR-MCNC: 190 MG/DL — HIGH (ref 70–99)
GLUCOSE SERPL-MCNC: 205 MG/DL — HIGH (ref 70–99)
HCT VFR BLD CALC: 40.8 % — SIGNIFICANT CHANGE UP (ref 39–50)
HGB BLD-MCNC: 13 G/DL — SIGNIFICANT CHANGE UP (ref 13–17)
MCHC RBC-ENTMCNC: 26 PG — LOW (ref 27–34)
MCHC RBC-ENTMCNC: 32 GM/DL — SIGNIFICANT CHANGE UP (ref 32–36)
MCV RBC AUTO: 81.3 FL — SIGNIFICANT CHANGE UP (ref 80–100)
PLATELET # BLD AUTO: 215 K/UL — SIGNIFICANT CHANGE UP (ref 150–400)
POTASSIUM SERPL-MCNC: 4.6 MMOL/L — SIGNIFICANT CHANGE UP (ref 3.5–5.3)
POTASSIUM SERPL-SCNC: 4.6 MMOL/L — SIGNIFICANT CHANGE UP (ref 3.5–5.3)
PROT SERPL-MCNC: 6.5 G/DL — SIGNIFICANT CHANGE UP (ref 6–8.3)
RBC # BLD: 5.02 M/UL — SIGNIFICANT CHANGE UP (ref 4.2–5.8)
RBC # FLD: 13.2 % — SIGNIFICANT CHANGE UP (ref 10.3–14.5)
SODIUM SERPL-SCNC: 141 MMOL/L — SIGNIFICANT CHANGE UP (ref 135–145)
WBC # BLD: 25.3 K/UL — HIGH (ref 3.8–10.5)
WBC # FLD AUTO: 25.3 K/UL — HIGH (ref 3.8–10.5)

## 2018-10-06 PROCEDURE — 70553 MRI BRAIN STEM W/O & W/DYE: CPT | Mod: 26

## 2018-10-06 PROCEDURE — 70450 CT HEAD/BRAIN W/O DYE: CPT | Mod: 26

## 2018-10-06 PROCEDURE — 99233 SBSQ HOSP IP/OBS HIGH 50: CPT

## 2018-10-06 RX ORDER — DEXAMETHASONE 0.5 MG/5ML
4 ELIXIR ORAL EVERY 6 HOURS
Qty: 0 | Refills: 0 | Status: DISCONTINUED | OUTPATIENT
Start: 2018-10-06 | End: 2018-10-08

## 2018-10-06 RX ORDER — ACETAMINOPHEN 500 MG
1000 TABLET ORAL ONCE
Qty: 0 | Refills: 0 | Status: COMPLETED | OUTPATIENT
Start: 2018-10-06 | End: 2018-10-06

## 2018-10-06 RX ORDER — SODIUM CHLORIDE 9 MG/ML
1000 INJECTION INTRAMUSCULAR; INTRAVENOUS; SUBCUTANEOUS
Qty: 0 | Refills: 0 | Status: DISCONTINUED | OUTPATIENT
Start: 2018-10-06 | End: 2018-10-06

## 2018-10-06 RX ORDER — ENOXAPARIN SODIUM 100 MG/ML
40 INJECTION SUBCUTANEOUS
Qty: 0 | Refills: 0 | Status: DISCONTINUED | OUTPATIENT
Start: 2018-10-06 | End: 2018-10-10

## 2018-10-06 RX ADMIN — LISINOPRIL 20 MILLIGRAM(S): 2.5 TABLET ORAL at 05:34

## 2018-10-06 RX ADMIN — Medication 4 MILLIGRAM(S): at 23:03

## 2018-10-06 RX ADMIN — SODIUM CHLORIDE 75 MILLILITER(S): 9 INJECTION INTRAMUSCULAR; INTRAVENOUS; SUBCUTANEOUS at 04:32

## 2018-10-06 RX ADMIN — Medication 400 MILLIGRAM(S): at 04:30

## 2018-10-06 RX ADMIN — Medication 102 MILLIGRAM(S): at 00:00

## 2018-10-06 RX ADMIN — Medication 6: at 22:21

## 2018-10-06 RX ADMIN — Medication 4 MILLIGRAM(S): at 18:30

## 2018-10-06 RX ADMIN — Medication 102 MILLIGRAM(S): at 11:52

## 2018-10-06 RX ADMIN — Medication 2: at 08:36

## 2018-10-06 RX ADMIN — Medication 102 MILLIGRAM(S): at 06:00

## 2018-10-06 RX ADMIN — ENOXAPARIN SODIUM 40 MILLIGRAM(S): 100 INJECTION SUBCUTANEOUS at 17:57

## 2018-10-06 RX ADMIN — LEVETIRACETAM 500 MILLIGRAM(S): 250 TABLET, FILM COATED ORAL at 05:35

## 2018-10-06 RX ADMIN — Medication 100 MILLIGRAM(S): at 06:00

## 2018-10-06 RX ADMIN — Medication 100 MILLIGRAM(S): at 05:34

## 2018-10-06 RX ADMIN — Medication 8 UNIT(S): at 14:18

## 2018-10-06 RX ADMIN — Medication 2: at 12:23

## 2018-10-06 RX ADMIN — OXYCODONE AND ACETAMINOPHEN 1 TABLET(S): 5; 325 TABLET ORAL at 19:50

## 2018-10-06 RX ADMIN — Medication 4: at 17:54

## 2018-10-06 RX ADMIN — Medication 8 UNIT(S): at 17:54

## 2018-10-06 RX ADMIN — INSULIN GLARGINE 24 UNIT(S): 100 INJECTION, SOLUTION SUBCUTANEOUS at 22:20

## 2018-10-06 RX ADMIN — LEVETIRACETAM 500 MILLIGRAM(S): 250 TABLET, FILM COATED ORAL at 17:57

## 2018-10-06 RX ADMIN — Medication 100 MILLIGRAM(S): at 23:03

## 2018-10-06 RX ADMIN — Medication 1000 MILLIGRAM(S): at 05:00

## 2018-10-06 RX ADMIN — Medication 8 UNIT(S): at 09:26

## 2018-10-06 RX ADMIN — Medication 100 MILLIGRAM(S): at 14:19

## 2018-10-06 RX ADMIN — OXYCODONE AND ACETAMINOPHEN 1 TABLET(S): 5; 325 TABLET ORAL at 20:30

## 2018-10-06 NOTE — PROGRESS NOTE ADULT - SUBJECTIVE AND OBJECTIVE BOX
HPI:  47 yo M w/ PMHX of diabetes, HTN, HLD, and seizures diagnosed in 2007 (reports only having 1 episode) at the same time as his diabetes. Presents today after a fall today at  [facility] while out with his family. Pt reports feeling lightheaded, falling, and losing consciousness for about 5-10 minutes and woke up confused and sweaty as per family. The fall happened 1 hour ago. Prior to the fall pt admits to having muscle pains in legs BL, severe HA which was accompanied by dizziness and blurry vision. Denies any CP, nausea, SOB, or hx of heart attacks. Finger stick done by EMS was 120. (02 Oct 2018 21:27)    On admission, the patient was:  GCS: 15  Hunt-Oswald:  modified Foster:  ICH score:  NIHSS:    Overnight Events:     ROS: negative [] unable to obtain as patient is comatose/intubated/aphasic []     VITALS:   T(C): 36.3 (10-06-18 @ 10:30), Max: 36.8 (10-05-18 @ 22:00)  HR: 81 (10-06-18 @ 11:05) (64 - 105)  BP: 119/78 (10-06-18 @ 11:05) (100/59 - 152/70)  RR: 16 (10-06-18 @ 11:05) (10 - 17)  SpO2: 98% (10-06-18 @ 11:05) (97% - 100%)    10-05-18 @ 07:01  -  10-06-18 @ 07:00  --------------------------------------------------------  IN: 1770 mL / OUT: 3710 mL / NET: -1940 mL    10-06-18 @ 07:01  -  10-06-18 @ 12:53  --------------------------------------------------------  IN: 375 mL / OUT: 300 mL / NET: 75 mL      LABS:                          13.0   25.3  )-----------( 215      ( 06 Oct 2018 02:45 )             40.8     10-06    141  |  106  |  11  ----------------------------<  205<H>  4.6   |  23  |  0.54    Ca    9.1      06 Oct 2018 02:45  Phos  3.8     10-05  Mg     2.0     10-05    TPro  6.5  /  Alb  3.7  /  TBili  0.5  /  DBili  x   /  AST  26  /  ALT  17  /  AlkPhos  69  10-06    PT/INR - ( 05 Oct 2018 07:23 )   PT: 14.3 sec;   INR: 1.32 ratio         PTT - ( 05 Oct 2018 07:23 )  PTT:34.1 sec  MEDS:  MEDICATIONS  (STANDING):  dexamethasone  IVPB 4 milliGRAM(s) IV Intermittent every 6 hours  docusate sodium 100 milliGRAM(s) Oral three times a day  insulin glargine Injectable (LANTUS) 24 Unit(s) SubCutaneous at bedtime  insulin lispro (HumaLOG) corrective regimen sliding scale   SubCutaneous Before meals and at bedtime  insulin lispro Injectable (HumaLOG) 8 Unit(s) SubCutaneous three times a day before meals  levETIRAcetam 500 milliGRAM(s) Oral two times a day  lisinopril 20 milliGRAM(s) Oral daily      EXAMINATION:  General:  calm  HEENT:  MMM  Neuro:  awake, alert, oriented x 3, follows commands, 5/5 strength b/l UE and b/l LE.  Cards:  RRR  Respiratory:  no respiratory distress  Adomen:  soft  Extremities:  no edema  Skin:  warm/dry HPI:  47 yo M w/ PMHX of diabetes, HTN, HLD, and seizures diagnosed in 2007 (reports only having 1 episode) at the same time as his diabetes. Presents today after a fall today at  [facility] while out with his family. Pt reports feeling lightheaded, falling, and losing consciousness for about 5-10 minutes and woke up confused and sweaty as per family. The fall happened 1 hour ago. Prior to the fall pt admits to having muscle pains in legs BL, severe HA which was accompanied by dizziness and blurry vision. Denies any CP, nausea, SOB, or hx of heart attacks. Finger stick done by EMS was 120. (02 Oct 2018 21:27)    On admission, the patient was:  GCS: 15  Hunt-Oswald:  modified Foster:  ICH score:  NIHSS:    Overnight Events: No acute events overnight.     ROS: Negative unless specified.     VITALS:   T(C): 36.3 (10-06-18 @ 10:30), Max: 36.8 (10-05-18 @ 22:00)  HR: 81 (10-06-18 @ 11:05) (64 - 105)  BP: 119/78 (10-06-18 @ 11:05) (100/59 - 152/70)  RR: 16 (10-06-18 @ 11:05) (10 - 17)  SpO2: 98% (10-06-18 @ 11:05) (97% - 100%)    10-05-18 @ 07:01  -  10-06-18 @ 07:00  --------------------------------------------------------  IN: 1770 mL / OUT: 3710 mL / NET: -1940 mL    10-06-18 @ 07:01  -  10-06-18 @ 12:53  --------------------------------------------------------  IN: 375 mL / OUT: 300 mL / NET: 75 mL      LABS:                          13.0   25.3  )-----------( 215      ( 06 Oct 2018 02:45 )             40.8     10-06    141  |  106  |  11  ----------------------------<  205<H>  4.6   |  23  |  0.54    Ca    9.1      06 Oct 2018 02:45  Phos  3.8     10-05  Mg     2.0     10-05    TPro  6.5  /  Alb  3.7  /  TBili  0.5  /  DBili  x   /  AST  26  /  ALT  17  /  AlkPhos  69  10-06    PT/INR - ( 05 Oct 2018 07:23 )   PT: 14.3 sec;   INR: 1.32 ratio         PTT - ( 05 Oct 2018 07:23 )  PTT:34.1 sec  MEDS:  MEDICATIONS  (STANDING):  dexamethasone  IVPB 4 milliGRAM(s) IV Intermittent every 6 hours  docusate sodium 100 milliGRAM(s) Oral three times a day  insulin glargine Injectable (LANTUS) 24 Unit(s) SubCutaneous at bedtime  insulin lispro (HumaLOG) corrective regimen sliding scale   SubCutaneous Before meals and at bedtime  insulin lispro Injectable (HumaLOG) 8 Unit(s) SubCutaneous three times a day before meals  levETIRAcetam 500 milliGRAM(s) Oral two times a day  lisinopril 20 milliGRAM(s) Oral daily      EXAMINATION:  General:  calm  HEENT:  MMM  Neuro:  awake, alert, oriented x 3, follows commands, 5/5 strength b/l UE and b/l LE.  Cards:  RRR  Respiratory:  no respiratory distress  Adomen:  soft  Extremities:  no edema  Skin:  warm/dry HPI:  49 yo M w/ PMHX of diabetes, HTN, HLD, and seizures diagnosed in 2007 (reports only having 1 episode) at the same time as his diabetes. Presents today after a fall today at [facility] while out with his family. Pt reports feeling lightheaded, falling, and losing consciousness for about 5-10 minutes and woke up confused and sweaty as per family. The fall happened 1 hour ago. Prior to the fall pt admits to having muscle pains in legs BL, severe HA which was accompanied by dizziness and blurry vision. Denies any CP, nausea, SOB, or hx of heart attacks. Finger stick done by EMS was 120. (02 Oct 2018 21:27)    On admission, the patient was:  GCS: 15  Hunt-Oswald:  modified Foster:  ICH score:  NIHSS:    PAST MEDICAL & SURGICAL HISTORY:  HTN, DM, HLD, seizures    PSH: no    Social history non smoker    Overnight Events: No acute events overnight.             REVIEW OF SYSTEMS: [ ] Unable to Assess due to neurologic exam   [x ] All ROS addressed below are non-contributory, except:  Neuro: [ x] Headache [ ] Back pain [ ] Numbness [ ] Weakness [ ] Ataxia [ ] Dizziness [ ] Aphasia [ ] Dysarthria [ ] Visual disturbance  Resp: [ ] Shortness of breath/dyspnea, [ ] Orthopnea [ ] Cough  CV: [ ] Chest pain [ ] Palpitation [ ] Lightheadedness [ ] Syncope  Renal: [ ] Thirst [ ] Edema  GI: [ ] Nausea [ ] Emesis [ ] Abdominal pain [ ] Constipation [ ] Diarrhea  Hem: [ ] Hematemesis [ ] bright red blood per rectum  ID: [ ] Fever [ ] Chills [ ] Dysuria  ENT: [ ] Rhinorrhea          VITALS:   T(C): 36.3 (10-06-18 @ 10:30), Max: 36.8 (10-05-18 @ 22:00)  HR: 81 (10-06-18 @ 11:05) (64 - 105)  BP: 119/78 (10-06-18 @ 11:05) (100/59 - 152/70)  RR: 16 (10-06-18 @ 11:05) (10 - 17)  SpO2: 98% (10-06-18 @ 11:05) (97% - 100%)    10-05-18 @ 07:01  -  10-06-18 @ 07:00  --------------------------------------------------------  IN: 1770 mL / OUT: 3710 mL / NET: -1940 mL    10-06-18 @ 07:01  -  10-06-18 @ 12:53  --------------------------------------------------------  IN: 375 mL / OUT: 300 mL / NET: 75 mL      LABS:                          13.0   25.3  )-----------( 215      ( 06 Oct 2018 02:45 )             40.8     10-06    141  |  106  |  11  ----------------------------<  205<H>  4.6   |  23  |  0.54    Ca    9.1      06 Oct 2018 02:45  Phos  3.8     10-05  Mg     2.0     10-05    TPro  6.5  /  Alb  3.7  /  TBili  0.5  /  DBili  x   /  AST  26  /  ALT  17  /  AlkPhos  69  10-06    PT/INR - ( 05 Oct 2018 07:23 )   PT: 14.3 sec;   INR: 1.32 ratio         PTT - ( 05 Oct 2018 07:23 )  PTT:34.1 sec  MEDS:  MEDICATIONS  (STANDING):  dexamethasone  IVPB 4 milliGRAM(s) IV Intermittent every 6 hours  docusate sodium 100 milliGRAM(s) Oral three times a day  insulin glargine Injectable (LANTUS) 24 Unit(s) SubCutaneous at bedtime  insulin lispro (HumaLOG) corrective regimen sliding scale   SubCutaneous Before meals and at bedtime  insulin lispro Injectable (HumaLOG) 8 Unit(s) SubCutaneous three times a day before meals  levETIRAcetam 500 milliGRAM(s) Oral two times a day  lisinopril 20 milliGRAM(s) Oral daily      EXAMINATION:  General:  calm  HEENT:  MMM  Neuro:  awake, alert, oriented x 3, follows commands, 5/5 strength b/l UE and b/l LE.  Cards:  RRR  Respiratory:  no respiratory distress  Adomen:  soft  Extremities:  no edema  Skin:  warm/dry      LABS:  Na: 141 (10-06 @ 02:45), 137 (10-05 @ 19:28), 135 (10-05 @ 07:30), 132 (10-05 @ 01:46), 134 (10-04 @ 18:40), 132 (10-04 @ 07:24)  K: 4.6 (10-06 @ 02:45), 4.5 (10-05 @ 19:28), 4.4 (10-05 @ 07:30), 4.7 (10-05 @ 01:46), 4.9 (10-04 @ 18:40), 4.6 (10-04 @ 07:24)  Cl: 106 (10-06 @ 02:45), 104 (10-05 @ 19:28), 101 (10-05 @ 07:30), 100 (10-05 @ 01:46), 100 (10-04 @ 18:40), 99 (10-04 @ 07:24)  CO2: 23 (10-06 @ 02:45), 22 (10-05 @ 19:28), 22 (10-05 @ 07:30), 22 (10-05 @ 01:46), 20 (10-04 @ 18:40), 23 (10-04 @ 07:24)  BUN: 11 (10-06 @ 02:45), 12 (10-05 @ 19:28), 16 (10-05 @ 07:30), 18 (10-05 @ 01:46), 20 (10-04 @ 18:40), 20 (10-04 @ 07:24)  Cr: 0.54 (10-06 @ 02:45), 0.58 (10-05 @ 19:28), 0.67 (10-05 @ 07:30), 0.75 (10-05 @ 01:46), 0.76 (10-04 @ 18:40), 0.71 (10-04 @ 07:24)  Glu: 205(10-06 @ 02:45), 253(10-05 @ 19:28), 251(10-05 @ 07:30), 294(10-05 @ 01:46), 425(10-04 @ 18:40), 296(10-04 @ 07:24)    Hgb: 13.0 (10-06 @ 02:45), 12.7 (10-05 @ 19:28), 14.6 (10-05 @ 07:31), 15.1 (10-04 @ 09:32)  Hct: 40.8 (10-06 @ 02:45), 40.3 (10-05 @ 19:28), 45.3 (10-05 @ 07:31), 47.3 (10-04 @ 09:32)  WBC: 25.3 (10-06 @ 02:45), 25.2 (10-05 @ 19:28), 22.8 (10-05 @ 07:31), 13.02 (10-04 @ 09:32)  Plt: 215 (10-06 @ 02:45), 214 (10-05 @ 19:28), 221 (10-05 @ 07:31), 237 (10-04 @ 09:32)    INR: 1.32 10-05-18 @ 07:23, 1.24 10-04-18 @ 09:35  PTT: 34.1 10-05-18 @ 07:23, 36.2 10-04-18 @ 09:35

## 2018-10-06 NOTE — PROGRESS NOTE ADULT - ASSESSMENT
Summary: 49 yo M s/p Rt Craniectomy for Rsxn of brain mass    NEURO:  q1h neuro checks  On Dexamethasone 4q6  Hx of seizure - Keppra 500 BID  Pain control w/ Dilaudid, Percocet and Tylenol prn    CARDS:   - 160  HTN - Continue home medication of lisinopril 20 mg daily    PULM:  sat > 92%    RENAL:  IVL    GASTRO: Consistent carbohydrate diet  ---> Stress ulcer prophylaxis:  Not indicated    HEME:    Leucocytosis noted; afebrile. Will monitor for infectious symptoms  ---> DVT prophylaxis: SCDs, hold anticoagulation since fresh post-op    ENDO:    DM; elevated BG - A1C 8.0  Lantus 24 units now, 8units premeal tid, medium sliding scale    ID:  afebrile  Celine-op abx    Code status:  Full code  Disposition:  ICU    This patient was at high risk of neurologic deterioration and/or death due to: post-op hemorrhage, cerebral edema, brain compression    Time spent:  45 minutes Summary: 49 yo M s/p Rt Craniectomy for Rsxn of brain mass    NEURO:  q1h neuro checks  On Dexamethasone 4q6  Hx of seizure - Keppra 500 BID  Pain control w/ Dilaudid, Percocet and Tylenol prn    CARDS:   - 160  HTN - Continue home medication of lisinopril 20 mg daily    PULM:  sat > 92%    RENAL:  IVL    GASTRO: Consistent carbohydrate diet  ---> Stress ulcer prophylaxis:  Not indicated    HEME:    Leucocytosis noted; afebrile. Will monitor for infectious symptoms  ---> DVT prophylaxis: SCDs, hold anticoagulation since fresh post-op    ENDO:    DM; elevated BG - A1C 8.0  Lantus 24 units now, 8units premeal tid, medium sliding scale    ID:  afebrile  Celine-op abx    Code status:  Full code  Disposition:  Floor    This patient was at high risk of neurologic deterioration and/or death due to: post-op hemorrhage, cerebral edema, brain compression    Time spent:  45 minutes

## 2018-10-06 NOTE — PROGRESS NOTE ADULT - SUBJECTIVE AND OBJECTIVE BOX
Vital Signs Last 24 Hrs  T(C): 36.8 (05 Oct 2018 22:00), Max: 36.9 (05 Oct 2018 05:25)  T(F): 98.2 (05 Oct 2018 22:00), Max: 98.4 (05 Oct 2018 05:25)  HR: 64 (05 Oct 2018 22:00) (64 - 109)  BP: 126/70 (05 Oct 2018 22:00) (100/59 - 134/81)  BP(mean): 93 (05 Oct 2018 22:00) (68 - 95)  RR: 13 (05 Oct 2018 22:00) (10 - 18)  SpO2: 99% (05 Oct 2018 22:00) (97% - 100%)    AOx3, FC, PERRL, EOMI, no facial   5/5 throughout, no drift  SILT  no clonus

## 2018-10-07 LAB
GLUCOSE BLDC GLUCOMTR-MCNC: 126 MG/DL — HIGH (ref 70–99)
GLUCOSE BLDC GLUCOMTR-MCNC: 192 MG/DL — HIGH (ref 70–99)
GLUCOSE BLDC GLUCOMTR-MCNC: 216 MG/DL — HIGH (ref 70–99)
GLUCOSE BLDC GLUCOMTR-MCNC: 260 MG/DL — HIGH (ref 70–99)
GLUCOSE BLDC GLUCOMTR-MCNC: 326 MG/DL — HIGH (ref 70–99)

## 2018-10-07 RX ADMIN — Medication 100 MILLIGRAM(S): at 05:57

## 2018-10-07 RX ADMIN — Medication 6: at 17:19

## 2018-10-07 RX ADMIN — SENNA PLUS 2 TABLET(S): 8.6 TABLET ORAL at 21:22

## 2018-10-07 RX ADMIN — LEVETIRACETAM 500 MILLIGRAM(S): 250 TABLET, FILM COATED ORAL at 17:19

## 2018-10-07 RX ADMIN — Medication 8 UNIT(S): at 09:19

## 2018-10-07 RX ADMIN — Medication 4: at 09:18

## 2018-10-07 RX ADMIN — INSULIN GLARGINE 24 UNIT(S): 100 INJECTION, SOLUTION SUBCUTANEOUS at 21:22

## 2018-10-07 RX ADMIN — Medication 8 UNIT(S): at 13:16

## 2018-10-07 RX ADMIN — Medication 100 MILLIGRAM(S): at 21:22

## 2018-10-07 RX ADMIN — Medication 4 MILLIGRAM(S): at 05:57

## 2018-10-07 RX ADMIN — Medication 2: at 13:16

## 2018-10-07 RX ADMIN — LEVETIRACETAM 500 MILLIGRAM(S): 250 TABLET, FILM COATED ORAL at 05:57

## 2018-10-07 RX ADMIN — Medication 4 MILLIGRAM(S): at 23:19

## 2018-10-07 RX ADMIN — LISINOPRIL 20 MILLIGRAM(S): 2.5 TABLET ORAL at 05:57

## 2018-10-07 RX ADMIN — Medication 650 MILLIGRAM(S): at 05:57

## 2018-10-07 RX ADMIN — Medication 650 MILLIGRAM(S): at 17:19

## 2018-10-07 RX ADMIN — Medication 8 UNIT(S): at 17:20

## 2018-10-07 RX ADMIN — Medication 100 MILLIGRAM(S): at 12:28

## 2018-10-07 RX ADMIN — Medication 4 MILLIGRAM(S): at 17:19

## 2018-10-07 RX ADMIN — Medication 8: at 21:22

## 2018-10-07 RX ADMIN — ENOXAPARIN SODIUM 40 MILLIGRAM(S): 100 INJECTION SUBCUTANEOUS at 17:19

## 2018-10-07 RX ADMIN — Medication 4 MILLIGRAM(S): at 12:27

## 2018-10-07 NOTE — PHYSICAL THERAPY INITIAL EVALUATION ADULT - PRECAUTIONS/LIMITATIONS, REHAB EVAL
fall precautions/CXR 10/2: Nodular right upper lobe opacity CXR 10/2: Nodular right upper lobe opacity.  MRI 10/3: heterogeneously enhancing necrotic appearing lesion in the right inferomedial frontal lobe. Intralesional hemorrhage and air-fluid level is noted. Mild surrounding vasogenic edema. Findings could represent primary CNS neoplasm or metastatic disease. 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 This may represent the presence of chronic ischemic changes.  CT brain 10/2: Brain CT: Loss of volume of the right cerebral hemisphere with multiple areas of encephalomalacia within the mesial right frontal and parietal regions as well as along the inferior right frontal lobe which is associated with coarse calcification possibly dystrophic in nature.Underlying mass at this location is not completely excluded./fall precautions

## 2018-10-07 NOTE — PHYSICAL THERAPY INITIAL EVALUATION ADULT - PERTINENT HX OF CURRENT PROBLEM, REHAB EVAL
PMHX: DM, HTN, HLD, seizures diagnosed in 2007. s/p fall today at [facility]. Pt reports feeling lightheaded, falling, +LOC ~5-10min, woke up confused & sweaty. Prior to the fall, pt admits to having muscle pains in BLEs, severe HA which was accompanied by dizziness & blurry vision. MRI: heterogeneously enhancing necrotic appearing lesion in the right inferomedial frontal lobe. CT CAP no obvious primary, concern for primary CNS neoplasm. s/p R craniectomy for resection of brain tumor 10/5

## 2018-10-07 NOTE — PROGRESS NOTE ADULT - SUBJECTIVE AND OBJECTIVE BOX
SUBJECTIVE: Patient seen and examined at bedside. Minimal pain at incision site. Denies chest pain, shortness of breath, nausea/vomiting, vision changes.     Vital Signs Last 24 Hrs  T(C): 36.4 (10-07-18 @ 08:22), Max: 37.5 (10-06-18 @ 21:24)  T(F): 97.6 (10-07-18 @ 08:22), Max: 99.5 (10-06-18 @ 21:24)  HR: 76 (10-07-18 @ 08:22) (71 - 103)  BP: 136/84 (10-07-18 @ 08:22) (121/85 - 149/90)  RR: 20 (10-07-18 @ 08:22) (16 - 20)  SpO2: 96% (10-07-18 @ 08:22) (95% - 98%)    PHYSICAL EXAM:    Constitutional: No Acute Distress, resting comfortably in bed    Neurological: Awake, alert, oriented to person, place and time, speech clear and fluent, face equal, tongue midline, briskly following commands, no drift, moves all extremities with 5/5 strength, sensation intact to light touch throughout, pupils 4mm and reactive bilaterally, extraocular movements intact, no nystagmus    Incision: +right crani dressing removed; +staples C/D/I    Pulmonary: Clear to Auscultation, No rales, No rhonchi, No wheezes     Cardiovascular: S1, S2, Regular rate and rhythm     Gastrointestinal: Soft, Non-tender, Non-distended, +bowel sounds x 4    Extremities: No calf tenderness bilaterally, no cyanosis, clubbing or edema      LABS:                          13.0   25.3  )-----------( 215      ( 06 Oct 2018 02:45 )             40.8    10-06    141  |  106  |  11  ----------------------------<  205<H>  4.6   |  23  |  0.54    Ca    9.1      06 Oct 2018 02:45  Phos  3.8     10-05  Mg     2.0     10-05    TPro  6.5  /  Alb  3.7  /  TBili  0.5  /  DBili  x   /  AST  26  /  ALT  17  /  AlkPhos  69  10-06      10-06 @ 07:01  -  10-07 @ 07:00  --------------------------------------------------------  IN: 855 mL / OUT: 300 mL / NET: 555 mL        IMAGING:     < from: MR Head w/wo IV Cont (10.06.18 @ 17:31) >  INTERPRETATION:  History: Status post surgery.    MRI of the brain was performed using sagittal T1, axial T 1 fast and echo   T2-weighted sequence with FLAIR diffusion and susceptibility weighted   sequence. The patient was injected with approximately 6 cc Gadavist IV   with 1.5 cc of contrast discarded. Sagittal coronal axial T1-weighted   sequences were performed.    This exam is compared with prior contrast-enhanced brain MRI performed on   October 4, 2018.    New postop changes compatible with a right frontal craniotomy is seen.   Previously noted heterogeneous enhancing lesion involving the medial   right frontal region has been resected. There is abnormal T1 shortening   and air identified in the postop bed. This T1 shortening could be   compatible areas of hemorrhage and or postop material. Clinical   correlation continued close interval follow-up is recommended. Subtle   peripheral enhancement is identified in the postop region as well as   along the postop bed. This could be compatible postop changes though   clinical correlation continued close and follow-up interval follow-up is   recommended. There is restricted diffusion seen involving the edges the   postop bed which could be compatible with expected postop changes though   the possibility of areas of acute infarct cannot be entirely excluded.   Clinical correlation continued close and follow-up recommended.    Extra axial air and fluid is identified postop region which measures   approximately 1.6 times widest diameter. There is mass effect seen on the   adjacent parenchyma.    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 The overall size and configuration the ventricles appear   unchanged.    Encephalomalacia and gliosis is again seen involving the medial right   parietal occipital region. 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    Extracalvarial soft tissue swelling staples areseen.    Air-fluid level seen involving the right maxillary sinus.    Both mastoid and middle ear regions appear clear.    Impression: Postop changes as described above.    < end of copied text >      MEDICATIONS:  Antibiotics:    Neuro:  acetaminophen   Tablet .. 650 milliGRAM(s) Oral every 6 hours PRN Temp greater or equal to 38C (100.4F), Mild Pain (1 - 3)  levETIRAcetam 500 milliGRAM(s) Oral two times a day  oxyCODONE    5 mG/acetaminophen 325 mG 1 Tablet(s) Oral every 4 hours PRN Moderate Pain (4 - 6)    Cardiac:  lisinopril 20 milliGRAM(s) Oral daily    Pulm:    GI/:  docusate sodium 100 milliGRAM(s) Oral three times a day  senna 2 Tablet(s) Oral at bedtime PRN Constipation    Other:   dexamethasone     Tablet 4 milliGRAM(s) Oral every 6 hours  enoxaparin Injectable 40 milliGRAM(s) SubCutaneous <User Schedule>  insulin glargine Injectable (LANTUS) 24 Unit(s) SubCutaneous at bedtime  insulin lispro (HumaLOG) corrective regimen sliding scale   SubCutaneous Before meals and at bedtime  insulin lispro Injectable (HumaLOG) 8 Unit(s) SubCutaneous three times a day before meals        DIET: consistent carbohydrate

## 2018-10-07 NOTE — PROGRESS NOTE ADULT - ASSESSMENT
HPI: 49 yo M w/ PMHX of diabetes, HTN, HLD, and seizures diagnosed in 2007 (reports only having 1 episode) at the same time as his diabetes. Presents today after a fall today at [facility] while out with his family. Pt reports feeling lightheaded, falling, and losing consciousness for about 5-10 minutes and woke up confused and sweaty as per family. The fall happened 1 hour ago. Prior to the fall pt admits to having muscle pains in legs BL, severe HA which was accompanied by dizziness and blurry vision. Denies any CP, nausea, SOB, or hx of heart attacks. Finger stick done by EMS was 120. (02 Oct 2018 21:27)    PROCEDURE: 10/5 s/p right craniotomy for brain tumor resection; POD#2      PLAN:   -Continue Decadron 4q6 for cerebral edema  -Continue Keppra 500 q12 for seizure prophylaxis  -Percocet PRN for pain control  -Continue Lisinopril 20mg daily for hx of HTN  -Continue Lantus 24 qHS, Humalog 8units TID AC, and low dose HISS for hx of T2DM and steroid induced hyperglycemia  -Leukocytosis likely secondary to steroids; patient afebrile. Will trend WBC.   -Colace, senna for bowel regimen  -Encouraged mobilization  -Inventive spirometer  -DVT prophylaxis: SQL, venodynes  -Dispo: PT/OT pending  -Will discuss with Dr. Shiraz Farr # [id number] HPI: 49 yo M w/ PMHX of diabetes, HTN, HLD, and seizures diagnosed in 2007 (reports only having 1 episode) at the same time as his diabetes. Presents today after a fall today at  [facility] while out with his family. Pt reports feeling lightheaded, falling, and losing consciousness for about 5-10 minutes and woke up confused and sweaty as per family. The fall happened 1 hour ago. Prior to the fall pt admits to having muscle pains in legs BL, severe HA which was accompanied by dizziness and blurry vision. Denies any CP, nausea, SOB, or hx of heart attacks. Finger stick done by EMS was 120. (02 Oct 2018 21:27)    PROCEDURE: 10/5 s/p right craniotomy for brain tumor resection; POD#2      PLAN:   -Continue Decadron 4q6 for cerebral edema  -Continue Keppra 500 q12 for history of seizures - patient was taking Dilantin 100mg qHS at home. Will f/u with neurology for antiepileptic management  -Percocet PRN for pain control  -Continue Lisinopril 20mg daily for hx of HTN  -Continue Lantus 24 qHS, Humalog 8units TID AC, and low dose HISS for hx of T2DM and steroid induced hyperglycemia  -Leukocytosis likely secondary to steroids; patient afebrile. Will trend WBC.   -Colace, senna for bowel regimen  -Encouraged mobilization  -Inventive spirometer  -DVT prophylaxis: SQL, venodynes  -Dispo: PT/OT pending  -Will discuss with Dr. Shiraz Farr # [id number]

## 2018-10-08 ENCOUNTER — TRANSCRIPTION ENCOUNTER (OUTPATIENT)
Age: 48
End: 2018-10-08

## 2018-10-08 LAB
ANION GAP SERPL CALC-SCNC: 9 MMOL/L — SIGNIFICANT CHANGE UP (ref 5–17)
BUN SERPL-MCNC: 17 MG/DL — SIGNIFICANT CHANGE UP (ref 7–23)
CALCIUM SERPL-MCNC: 9.3 MG/DL — SIGNIFICANT CHANGE UP (ref 8.4–10.5)
CHLORIDE SERPL-SCNC: 99 MMOL/L — SIGNIFICANT CHANGE UP (ref 96–108)
CO2 SERPL-SCNC: 25 MMOL/L — SIGNIFICANT CHANGE UP (ref 22–31)
CREAT SERPL-MCNC: 0.55 MG/DL — SIGNIFICANT CHANGE UP (ref 0.5–1.3)
GLUCOSE BLDC GLUCOMTR-MCNC: 147 MG/DL — HIGH (ref 70–99)
GLUCOSE BLDC GLUCOMTR-MCNC: 206 MG/DL — HIGH (ref 70–99)
GLUCOSE BLDC GLUCOMTR-MCNC: 220 MG/DL — HIGH (ref 70–99)
GLUCOSE BLDC GLUCOMTR-MCNC: 243 MG/DL — HIGH (ref 70–99)
GLUCOSE BLDC GLUCOMTR-MCNC: 276 MG/DL — HIGH (ref 70–99)
GLUCOSE BLDC GLUCOMTR-MCNC: 338 MG/DL — HIGH (ref 70–99)
GLUCOSE SERPL-MCNC: 238 MG/DL — HIGH (ref 70–99)
HCT VFR BLD CALC: 40 % — SIGNIFICANT CHANGE UP (ref 39–50)
HGB BLD-MCNC: 13 G/DL — SIGNIFICANT CHANGE UP (ref 13–17)
MCHC RBC-ENTMCNC: 26.2 PG — LOW (ref 27–34)
MCHC RBC-ENTMCNC: 32.4 GM/DL — SIGNIFICANT CHANGE UP (ref 32–36)
MCV RBC AUTO: 81 FL — SIGNIFICANT CHANGE UP (ref 80–100)
PLATELET # BLD AUTO: 226 K/UL — SIGNIFICANT CHANGE UP (ref 150–400)
POTASSIUM SERPL-MCNC: 4.6 MMOL/L — SIGNIFICANT CHANGE UP (ref 3.5–5.3)
POTASSIUM SERPL-SCNC: 4.6 MMOL/L — SIGNIFICANT CHANGE UP (ref 3.5–5.3)
RBC # BLD: 4.94 M/UL — SIGNIFICANT CHANGE UP (ref 4.2–5.8)
RBC # FLD: 12.7 % — SIGNIFICANT CHANGE UP (ref 10.3–14.5)
SODIUM SERPL-SCNC: 133 MMOL/L — LOW (ref 135–145)
WBC # BLD: 20 K/UL — HIGH (ref 3.8–10.5)
WBC # FLD AUTO: 20 K/UL — HIGH (ref 3.8–10.5)

## 2018-10-08 PROCEDURE — 99223 1ST HOSP IP/OBS HIGH 75: CPT

## 2018-10-08 RX ORDER — POLYETHYLENE GLYCOL 3350 17 G/17G
17 POWDER, FOR SOLUTION ORAL
Qty: 0 | Refills: 0 | Status: DISCONTINUED | OUTPATIENT
Start: 2018-10-08 | End: 2018-10-10

## 2018-10-08 RX ORDER — DEXAMETHASONE 0.5 MG/5ML
4 ELIXIR ORAL EVERY 8 HOURS
Qty: 0 | Refills: 0 | Status: DISCONTINUED | OUTPATIENT
Start: 2018-10-08 | End: 2018-10-09

## 2018-10-08 RX ORDER — SENNA PLUS 8.6 MG/1
2 TABLET ORAL AT BEDTIME
Qty: 0 | Refills: 0 | Status: DISCONTINUED | OUTPATIENT
Start: 2018-10-08 | End: 2018-10-10

## 2018-10-08 RX ADMIN — POLYETHYLENE GLYCOL 3350 17 GRAM(S): 17 POWDER, FOR SOLUTION ORAL at 18:11

## 2018-10-08 RX ADMIN — Medication 4 MILLIGRAM(S): at 21:48

## 2018-10-08 RX ADMIN — Medication 100 MILLIGRAM(S): at 05:13

## 2018-10-08 RX ADMIN — Medication 8 UNIT(S): at 18:11

## 2018-10-08 RX ADMIN — LISINOPRIL 20 MILLIGRAM(S): 2.5 TABLET ORAL at 05:13

## 2018-10-08 RX ADMIN — ENOXAPARIN SODIUM 40 MILLIGRAM(S): 100 INJECTION SUBCUTANEOUS at 18:12

## 2018-10-08 RX ADMIN — Medication 4 MILLIGRAM(S): at 13:20

## 2018-10-08 RX ADMIN — Medication 4: at 13:19

## 2018-10-08 RX ADMIN — LEVETIRACETAM 500 MILLIGRAM(S): 250 TABLET, FILM COATED ORAL at 18:11

## 2018-10-08 RX ADMIN — Medication 8: at 21:49

## 2018-10-08 RX ADMIN — LEVETIRACETAM 500 MILLIGRAM(S): 250 TABLET, FILM COATED ORAL at 05:13

## 2018-10-08 RX ADMIN — Medication 4: at 09:21

## 2018-10-08 RX ADMIN — Medication 4 MILLIGRAM(S): at 05:13

## 2018-10-08 RX ADMIN — Medication 8 UNIT(S): at 09:21

## 2018-10-08 RX ADMIN — SENNA PLUS 2 TABLET(S): 8.6 TABLET ORAL at 21:48

## 2018-10-08 RX ADMIN — POLYETHYLENE GLYCOL 3350 17 GRAM(S): 17 POWDER, FOR SOLUTION ORAL at 09:20

## 2018-10-08 RX ADMIN — Medication 100 MILLIGRAM(S): at 21:49

## 2018-10-08 RX ADMIN — Medication 8 UNIT(S): at 13:20

## 2018-10-08 RX ADMIN — Medication 100 MILLIGRAM(S): at 13:20

## 2018-10-08 RX ADMIN — INSULIN GLARGINE 24 UNIT(S): 100 INJECTION, SOLUTION SUBCUTANEOUS at 21:52

## 2018-10-08 NOTE — DISCHARGE NOTE ADULT - REASON FOR ADMISSION
Admitted 10/2 seizures; found with right frontal brain tumor; 10/5 s/p right craniotomy for resection of right frontal brain tumor Admitted 10/2 seizures; found with right frontal brain tumor; 10/5/18  s/p right craniotomy for resection of right frontal brain tumor

## 2018-10-08 NOTE — DISCHARGE NOTE ADULT - MEDICATION SUMMARY - MEDICATIONS TO STOP TAKING
I will STOP taking the medications listed below when I get home from the hospital:    phenytoin 100 mg oral capsule, extended release  -- 1 cap(s) by mouth once a day (at bedtime)

## 2018-10-08 NOTE — OCCUPATIONAL THERAPY INITIAL EVALUATION ADULT - PLANNED THERAPY INTERVENTIONS, OT EVAL
balance training/neuromuscular re-education/ADL retraining/transfer training/parent/caregiver training...

## 2018-10-08 NOTE — DISCHARGE NOTE ADULT - CARE PROVIDERS DIRECT ADDRESSES
,[email or web address],[email or web address] ,[email or web address],DirectAddress_Unknown,[email or web address],DirectAddress_Unknown

## 2018-10-08 NOTE — DISCHARGE NOTE ADULT - ADDITIONAL INSTRUCTIONS
Return to ER if develops seizures, fevers, bleeding , wound drainage, uncontrolled pain, weakness of extremities, lethargy or sluggishness.. Return to ER if develops seizures, fevers, bleeding , wound drainage, uncontrolled pain, weakness of extremities, lethargy or sluggishness..  Do not take Aspirin  Motrin, aleve , Naproxen for pain.

## 2018-10-08 NOTE — PROGRESS NOTE ADULT - SUBJECTIVE AND OBJECTIVE BOX
SUBJECTIVE: Patient seen and examined at bedside. Minimal pain at incision site, swelling over right eye. No bowel movement since admission. Denies chest pain, shortness of breath, nausea/vomiting, vision changes.     Vital Signs Last 24 Hrs  T(C): 36.9 (10-08-18 @ 05:00), Max: 36.9 (10-08-18 @ 05:00)  T(F): 98.5 (10-08-18 @ 05:00), Max: 98.5 (10-08-18 @ 05:00)  HR: 88 (10-08-18 @ 05:00) (59 - 101)  BP: 121/86 (10-08-18 @ 05:00) (109/78 - 136/84)  RR: 16 (10-08-18 @ 05:00) (16 - 20)  SpO2: 98% (10-08-18 @ 05:00) (96% - 98%)      PHYSICAL EXAM:    Constitutional: No Acute Distress, resting comfortably in bed    Neurological: Awake, alert, oriented to person, place and time, speech clear and fluent, face equal, tongue midline, briskly following commands, no drift, moves all extremities with 5/5 strength, sensation intact to light touch throughout, pupils 4mm and reactive bilaterally, extraocular movements intact, no nystagmus    Incision: +right crani staples C/D/I; +right periorbital edema    Pulmonary: Clear to Auscultation, No rales, No rhonchi, No wheezes     Cardiovascular: S1, S2, Regular rate and rhythm     Gastrointestinal: Soft, Non-tender, Non-distended, +bowel sounds x 4    Extremities: No calf tenderness bilaterally, no cyanosis, clubbing or edema      LABS:                          13.0   20.0  )-----------( 226      ( 08 Oct 2018 06:28 )             40.0   10-08    133<L>  |  99  |  17  ----------------------------<  238<H>  4.6   |  25  |  0.55    Ca    9.3      08 Oct 2018 06:26            IMAGING:     < from: MR Head w/wo IV Cont (10.06.18 @ 17:31) >  INTERPRETATION:  History: Status post surgery.    MRI of the brain was performed using sagittal T1, axial T 1 fast and echo   T2-weighted sequence with FLAIR diffusion and susceptibility weighted   sequence. The patient was injected with approximately 6 cc Gadavist IV   with 1.5 cc of contrast discarded. Sagittal coronal axial T1-weighted   sequences were performed.    This exam is compared with prior contrast-enhanced brain MRI performed on   October 4, 2018.    New postop changes compatible with a right frontal craniotomy is seen.   Previously noted heterogeneous enhancing lesion involving the medial   right frontal region has been resected. There is abnormal T1 shortening   and air identified in the postop bed. This T1 shortening could be   compatible areas of hemorrhage and or postop material. Clinical   correlation continued close interval follow-up is recommended. Subtle   peripheral enhancement is identified in the postop region as well as   along the postop bed. This could be compatible postop changes though   clinical correlation continued close and follow-up interval follow-up is   recommended. There is restricted diffusion seen involving the edges the   postop bed which could be compatible with expected postop changes though   the possibility of areas of acute infarct cannot be entirely excluded.   Clinical correlation continued close and follow-up recommended.    Extra axial air and fluid is identified postop region which measures   approximately 1.6 times widest diameter. There is mass effect seen on the   adjacent parenchyma.    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 The overall size and configuration the ventricles appear   unchanged.    Encephalomalacia and gliosis is again seen involving the medial right   parietal occipital region. 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    Extracalvarial soft tissue swelling staples areseen.    Air-fluid level seen involving the right maxillary sinus.    Both mastoid and middle ear regions appear clear.    Impression: Postop changes as described above.    < end of copied text >      MEDICATIONS:  Antibiotics:    Neuro:  acetaminophen   Tablet .. 650 milliGRAM(s) Oral every 6 hours PRN Temp greater or equal to 38C (100.4F), Mild Pain (1 - 3)  levETIRAcetam 500 milliGRAM(s) Oral two times a day  oxyCODONE    5 mG/acetaminophen 325 mG 1 Tablet(s) Oral every 4 hours PRN Moderate Pain (4 - 6)    Cardiac:  lisinopril 20 milliGRAM(s) Oral daily    Pulm:    GI/:  docusate sodium 100 milliGRAM(s) Oral three times a day  senna 2 Tablet(s) Oral at bedtime PRN Constipation    Other:   dexamethasone     Tablet 4 milliGRAM(s) Oral every 6 hours  enoxaparin Injectable 40 milliGRAM(s) SubCutaneous <User Schedule>  insulin glargine Injectable (LANTUS) 24 Unit(s) SubCutaneous at bedtime  insulin lispro (HumaLOG) corrective regimen sliding scale   SubCutaneous Before meals and at bedtime  insulin lispro Injectable (HumaLOG) 8 Unit(s) SubCutaneous three times a day before meals        DIET: consistent carbohydrate

## 2018-10-08 NOTE — OCCUPATIONAL THERAPY INITIAL EVALUATION ADULT - PRECAUTIONS/LIMITATIONS, REHAB EVAL
47 yo M w/ PMHX of diabetes, HTN, HLD, and seizures diagnosed in 2007 (reports only having 1 episode) at the same time as his diabetes. Presents after a fall at [employer] while out with his family. Pt reports feeling lightheaded, falling, and losing consciousness for about 5-10 minutes and woke up confused and sweaty as per family. Prior to the fall pt admits to having muscle pains in legs BL, severe HA which was accompanied by dizziness and blurry vision. Denies any CP, nausea, SOB, or hx of heart attacks.  Pt found to have R brain mass and now s/p R craniotomy for tumor re-section. fall precautions/47 yo M w/ PMHX of diabetes, HTN, HLD, and seizures diagnosed in 2007 (reports only having 1 episode) at the same time as his diabetes. Presents after a fall at  max while out with his family. Pt reports feeling lightheaded, falling, and losing consciousness for about 5-10 minutes and woke up confused and sweaty as per family. Prior to the fall pt admits to having muscle pains in legs BL, severe HA which was accompanied by dizziness and blurry vision. Denies any CP, nausea, SOB, or hx of heart attacks.  Pt found to have R brain mass and now s/p R craniotomy for tumor re-section.

## 2018-10-08 NOTE — DISCHARGE NOTE ADULT - PATIENT PORTAL LINK FT
You can access the [facility], offered by [facility], by registering with the following website: [email or web address]

## 2018-10-08 NOTE — DISCHARGE NOTE ADULT - MEDICATION SUMMARY - MEDICATIONS TO TAKE
I will START or STAY ON the medications listed below when I get home from the hospital:    dexamethasone 4 mg oral tablet  -- 1 tab(s) by mouth every 12 hours x 2 doses then   1/2 tab q12hrs until 10/13/18 then 1/2 tab daily until follow up with Dr Weldon  -- Indication: For decrease cerebral edema    acetaminophen 325 mg oral tablet  -- 2 tab(s) by mouth every 6 hours, As needed, Mild Pain (1 - 3)  -- Indication: For Headaches     lisinopril 20 mg oral tablet  -- 1 tab(s) by mouth once a day  -- Indication: For HTN    levETIRAcetam 500 mg oral tablet  -- 1 tab(s) by mouth 2 times a day  -- Indication: For Seizures     HumuLIN 70/30 subcutaneous suspension  -- 15 unit(s) subcutaneous once a day (in the morning) with breakfast   -- Do not drink alcoholic beverages when taking this medication.  It is very important that you take or use this exactly as directed.  Do not skip doses or discontinue unless directed by your doctor.  Keep in refrigerator.  Do not freeze.    -- Indication: For Type 2 diabetes mellitus    HumuLIN 70/30 subcutaneous suspension  -- 10 unit(s) subcutaneous once a day (in the evening) with dinner until 10/13/18 night then stop   -- Do not drink alcoholic beverages when taking this medication.  It is very important that you take or use this exactly as directed.  Do not skip doses or discontinue unless directed by your doctor.  Keep in refrigerator.  Do not freeze.    -- Indication: For Type 2 diabetes mellitus    metFORMIN 1000 mg oral tablet  -- 1 tab(s) by mouth 2 times a day   -- Check with your doctor before becoming pregnant.  Do not drink alcoholic beverages when taking this medication.  It is very important that you take or use this exactly as directed.  Do not skip doses or discontinue unless directed by your doctor.  Obtain medical advice before taking any non-prescription drugs as some may affect the action of this medication.  Take with food or milk.    -- Indication: For Type 2 diabetes mellitus

## 2018-10-08 NOTE — DISCHARGE NOTE ADULT - PROVIDER TOKENS
TOKEMIR:'[id number]',TOKEMIR:'[id number]' TOKEN:'[id number]',FREE:[LAST:[Víctor],PHONE:[(   )    -],FAX:[(   )    -],ADDRESS:[Medical clinic on 10/15/18 at 3:30 pm [phone]]],TOKEN:'[id number]' TOKEN:'[id number]',TOKEN:'[id number]',FREE:[LAST:[Dr Renee],PHONE:[(   )    -],FAX:[(   )    -],ADDRESS:[[facility] on 10/15/18 [address] [place] [address]  [phone]]]

## 2018-10-08 NOTE — DISCHARGE NOTE ADULT - NS AS ACTIVITY OBS
Do not make important decisions/Do not drive or operate machinery/Showering allowed/Stairs allowed/Walking-Indoors allowed/Walking-Outdoors allowed/No Heavy lifting/straining

## 2018-10-08 NOTE — DISCHARGE NOTE ADULT - PLAN OF CARE
s/p craniotomy for resection of right frontal brain tumor .  .  Please make an appointment for follow up with neurosurgeon Dr. Weldon in 1-2 weeks. Call [phone] to schedule an appointment. Staples will be removed at follow up appointment. Keep incision site clean and dry. No creams, lotions, or ointments to incision area. Ok to shower but do not allow water to hit incision site directly. Gently pat dry after shower.     NO heavy lifting, strenuous activity, twisting, bending, driving, or working until cleared by your physician.   Return to ER immediately for any of the following: fever, bleeding, new onset numbness/tingling/weakness, nausea and/or vomiting, chest pain, shortness of breath, confusion, seizure, altered mental status, urinary and/or fecal incontinence or retention. .  Please make an appointment for follow up with your primary care physician after discharge. .  Please make an appointment for follow up with neurologist Dr. Harrison after discharge. Your seizure medication Dilantin was changed to Keppra 500 mg twice a day for improved seizure management. Please make an appointment for follow up with your primary care physician after discharge. s/p craniotomy for resection of right frontal brain tumor 10/5/18 Keep Incision Clean and Dry. May shower . pat dry after. no creams or lotions on incision. No immersion baths..  Incision evaluation and staple removal in 1 week at Dr Weldon office       .  Please make an appointment for follow up with neurosurgeon Dr. Wledon in 1-2 weeks. Call [phone] to schedule an appointment. Staples will be removed at follow up appointment. Keep incision site clean and dry. No creams, lotions, or ointments to incision area. Ok to shower but do not allow water to hit incision site directly. Gently pat dry after shower.     NO heavy lifting, strenuous activity, twisting, bending, driving, or working until cleared by your physician.   Return to ER immediately for any of the following: fever, bleeding, new onset numbness/tingling/weakness, nausea and/or vomiting, chest pain, shortness of breath, confusion, seizure, altered mental status, urinary and/or fecal incontinence or retention. .  Please follow up at medical clinic 10/15/18at 3:30 pm Keep Incision Clean and Dry. May shower . pat dry after. no creams or lotions on incision. No immersion baths..  Incision evaluation and staple removal on 10/24/18 at 11;30 am  at Dr Weldon office .  Discuss pathology results and further treatment plans. Pathology Low grade Glioneuronal tumor- Ganglioglioma       .  Please make an appointment for follow up with neurosurgeon Dr. Weldon in 1-2 weeks. Call [phone] to schedule an appointment. Staples will be removed at follow up appointment. Keep incision site clean and dry. No creams, lotions, or ointments to incision area. Ok to shower but do not allow water to hit incision site directly. Gently pat dry after shower.     NO heavy lifting, strenuous activity, twisting, bending, driving, or working until cleared by your physician.   Return to ER immediately for any of the following: fever, bleeding, new onset numbness/tingling/weakness, nausea and/or vomiting, chest pain, shortness of breath, confusion, seizure, altered mental status, urinary and/or fecal incontinence or retention. .  Please follow up at medical clinic 10/15/18 at 3:30 pm .Please follow up at medical clinic 10/15/18 at 3:30 pm Do not drive or operate machinery until cleared by neurologist  Continue Keppra Please follow up at medical clinic 10/15/18 at 3:30 pm  Continue Humulin 70/30 as prescribed while on steroids and Continue metformin Continue lisnopril   Please follow up at medical clinic 10/15/18 at 3:30 pm Please follow up at medical clinic 10/15/18 at 3:30 pm  Continue Humulin 70/30 as prescribed while on steroids and Continue metformin  Finger stick blood sugar before meals and bedtime. Keep log. Come to ER if finger stick blood sugar consistently >400 x 3 reads . Drink apple juice if blood sugars less than 80mg/dl. Call physician if blood sugars less than 100 for 2-3 readings. hold insulin if blood sugars less than 100mg/dl

## 2018-10-08 NOTE — DISCHARGE NOTE ADULT - CARE PROVIDER_API CALL
Dino Weldon), Neurological Surgery  [address]  [address] [place] [address]  Phone: [phone]  Fax: [phone]    Kirill Benavidez), Internal Medicine  [address] [place] [address]  Phone: [phone]  Fax: [phone] Dino Weldon), Neurological Surgery  [address]  [address] [place] [address]  Phone: [phone]  Fax: [phone]    [facility] at 3:30 pm [address]  [phone]  Phone: (   )    -  Fax: (   )    -    Fer Munson), Neurology  [address] [place] [address]  Phone: [phone]  Fax: [phone] Dino Weldon), Neurological Surgery  [address]  [address] [place] [address]  Phone: [phone]  Fax: [phone]    Fer Munson), Neurology  [address] [place] [address]  Phone: [phone]  Fax: [phone]    Dr Renee,   Medical Clinic on 10/15/18 [address] [place] [address]  [phone]  Phone: (   )    -  Fax: (   )    -

## 2018-10-08 NOTE — DISCHARGE NOTE ADULT - CARE PLAN
Principal Discharge DX:	Brain tumor  Goal:	s/p craniotomy for resection of right frontal brain tumor  Assessment and plan of treatment:	.  .  Please make an appointment for follow up with neurosurgeon Dr. Weldon in 1-2 weeks. Call [phone] to schedule an appointment. Staples will be removed at follow up appointment. Keep incision site clean and dry. No creams, lotions, or ointments to incision area. Ok to shower but do not allow water to hit incision site directly. Gently pat dry after shower.     NO heavy lifting, strenuous activity, twisting, bending, driving, or working until cleared by your physician.   Return to ER immediately for any of the following: fever, bleeding, new onset numbness/tingling/weakness, nausea and/or vomiting, chest pain, shortness of breath, confusion, seizure, altered mental status, urinary and/or fecal incontinence or retention.  Secondary Diagnosis:	Hypertension  Assessment and plan of treatment:	.  Please make an appointment for follow up with your primary care physician after discharge.  Secondary Diagnosis:	Hyperlipidemia  Assessment and plan of treatment:	.  Please make an appointment for follow up with your primary care physician after discharge.  Secondary Diagnosis:	Seizure  Assessment and plan of treatment:	.  Please make an appointment for follow up with neurologist Dr. Harrison after discharge. Your seizure medication Dilantin was changed to Keppra 500 mg twice a day for improved seizure management.  Secondary Diagnosis:	Type 2 diabetes mellitus  Assessment and plan of treatment:	Please make an appointment for follow up with your primary care physician after discharge. Principal Discharge DX:	Brain tumor  Goal:	s/p craniotomy for resection of right frontal brain tumor 10/5/18  Assessment and plan of treatment:	Keep Incision Clean and Dry. May shower . pat dry after. no creams or lotions on incision. No immersion baths..  Incision evaluation and staple removal in 1 week at Dr Weldon office       .  Please make an appointment for follow up with neurosurgeon Dr. Weldon in 1-2 weeks. Call [phone] to schedule an appointment. Staples will be removed at follow up appointment. Keep incision site clean and dry. No creams, lotions, or ointments to incision area. Ok to shower but do not allow water to hit incision site directly. Gently pat dry after shower.     NO heavy lifting, strenuous activity, twisting, bending, driving, or working until cleared by your physician.   Return to ER immediately for any of the following: fever, bleeding, new onset numbness/tingling/weakness, nausea and/or vomiting, chest pain, shortness of breath, confusion, seizure, altered mental status, urinary and/or fecal incontinence or retention.  Secondary Diagnosis:	Hypertension  Assessment and plan of treatment:	.  Please follow up at medical clinic 10/15/18at 3:30 pm  Secondary Diagnosis:	Hyperlipidemia  Assessment and plan of treatment:	.  Please make an appointment for follow up with your primary care physician after discharge.  Secondary Diagnosis:	Seizure  Assessment and plan of treatment:	.  Please make an appointment for follow up with neurologist Dr. Harrison after discharge. Your seizure medication Dilantin was changed to Keppra 500 mg twice a day for improved seizure management.  Secondary Diagnosis:	Type 2 diabetes mellitus  Assessment and plan of treatment:	Please make an appointment for follow up with your primary care physician after discharge. Principal Discharge DX:	Brain tumor  Goal:	s/p craniotomy for resection of right frontal brain tumor 10/5/18  Assessment and plan of treatment:	Keep Incision Clean and Dry. May shower . pat dry after. no creams or lotions on incision. No immersion baths..  Incision evaluation and staple removal on 10/24/18 at 11;30 am  at Dr Weldon office .  Discuss pathology results and further treatment plans. Pathology Low grade Glioneuronal tumor- Ganglioglioma       .  Please make an appointment for follow up with neurosurgeon Dr. Weldon in 1-2 weeks. Call [phone] to schedule an appointment. Staples will be removed at follow up appointment. Keep incision site clean and dry. No creams, lotions, or ointments to incision area. Ok to shower but do not allow water to hit incision site directly. Gently pat dry after shower.     NO heavy lifting, strenuous activity, twisting, bending, driving, or working until cleared by your physician.   Return to ER immediately for any of the following: fever, bleeding, new onset numbness/tingling/weakness, nausea and/or vomiting, chest pain, shortness of breath, confusion, seizure, altered mental status, urinary and/or fecal incontinence or retention.  Secondary Diagnosis:	Hypertension  Assessment and plan of treatment:	.  Please follow up at medical clinic 10/15/18 at 3:30 pm  Secondary Diagnosis:	Hyperlipidemia  Assessment and plan of treatment:	.Please follow up at medical clinic 10/15/18 at 3:30 pm  Secondary Diagnosis:	Seizure  Assessment and plan of treatment:	Do not drive or operate machinery until cleared by neurologist  Howard Orta  Secondary Diagnosis:	Type 2 diabetes mellitus  Assessment and plan of treatment:	Please follow up at medical clinic 10/15/18 at 3:30 pm  Continue Humulin 70/30 as prescribed while on steroids and Continue metformin Principal Discharge DX:	Brain tumor  Goal:	s/p craniotomy for resection of right frontal brain tumor 10/5/18  Assessment and plan of treatment:	Keep Incision Clean and Dry. May shower . pat dry after. no creams or lotions on incision. No immersion baths..  Incision evaluation and staple removal on 10/24/18 at 11;30 am  at Dr Weldon office .  Discuss pathology results and further treatment plans. Pathology Low grade Glioneuronal tumor- Ganglioglioma       .  Please make an appointment for follow up with neurosurgeon Dr. Weldon in 1-2 weeks. Call [phone] to schedule an appointment. Staples will be removed at follow up appointment. Keep incision site clean and dry. No creams, lotions, or ointments to incision area. Ok to shower but do not allow water to hit incision site directly. Gently pat dry after shower.     NO heavy lifting, strenuous activity, twisting, bending, driving, or working until cleared by your physician.   Return to ER immediately for any of the following: fever, bleeding, new onset numbness/tingling/weakness, nausea and/or vomiting, chest pain, shortness of breath, confusion, seizure, altered mental status, urinary and/or fecal incontinence or retention.  Secondary Diagnosis:	Hypertension  Assessment and plan of treatment:	Continue lisnopril   Please follow up at medical clinic 10/15/18 at 3:30 pm  Secondary Diagnosis:	Hyperlipidemia  Assessment and plan of treatment:	.Please follow up at medical clinic 10/15/18 at 3:30 pm  Secondary Diagnosis:	Seizure  Assessment and plan of treatment:	Do not drive or operate machinery until cleared by neurologist  Continue Keppra  Secondary Diagnosis:	Type 2 diabetes mellitus  Assessment and plan of treatment:	Please follow up at medical clinic 10/15/18 at 3:30 pm  Continue Humulin 70/30 as prescribed while on steroids and Continue metformin  Finger stick blood sugar before meals and bedtime. Keep log. Come to ER if finger stick blood sugar consistently >400 x 3 reads . Drink apple juice if blood sugars less than 80mg/dl. Call physician if blood sugars less than 100 for 2-3 readings. hold insulin if blood sugars less than 100mg/dl

## 2018-10-08 NOTE — PROGRESS NOTE ADULT - ASSESSMENT
HPI: 49 yo M w/ PMHX of diabetes, HTN, HLD, and seizures diagnosed in 2007 (reports only having 1 episode) at the same time as his diabetes. Presents today after a fall today at [employer] while out with his family. Pt reports feeling lightheaded, falling, and losing consciousness for about 5-10 minutes and woke up confused and sweaty as per family. The fall happened 1 hour ago. Prior to the fall pt admits to having muscle pains in legs BL, severe HA which was accompanied by dizziness and blurry vision. Denies any CP, nausea, SOB, or hx of heart attacks. Finger stick done by EMS was 120. (02 Oct 2018 21:27)    PROCEDURE: 10/5 s/p right craniotomy for brain tumor resection; POD#3      PLAN:   -Continue Decadron for cerebral edema; will taper to 4q8 today  -Continue Keppra 500 q12 for history of seizures-patient was taking Dilantin 100mg qHS at home. Will f/u with neurology for antiepileptic mgmt  -Percocet PRN for pain control  -Continue Lisinopril 20mg daily for hx of HTN  -Continue Lantus 24 qHS, Humalog 8units TID AC, and low dose HISS for hx of T2DM and steroid induced hyperglycemia  -Leukocytosis improving - likely secondary to steroids; patient afebrile. Will trend WBC.   -Continue colace, senna for bowel regimen. Will add miralax today.  -Encouraged mobilization  -Inventive spirometer  -DVT prophylaxis: SQL, venodynes  -Dispo: no PT needs  -Possible discharge home today  -Will discuss with Dr. Shiraz Farr # [id number] HPI: 47 yo M w/ PMHX of diabetes, HTN, HLD, and seizures diagnosed in 2007 (reports only having 1 episode) at the same time as his diabetes. Presents today after a fall today at [employer] while out with his family. Pt reports feeling lightheaded, falling, and losing consciousness for about 5-10 minutes and woke up confused and sweaty as per family. The fall happened 1 hour ago. Prior to the fall pt admits to having muscle pains in legs BL, severe HA which was accompanied by dizziness and blurry vision. Denies any CP, nausea, SOB, or hx of heart attacks. Finger stick done by EMS was 120. (02 Oct 2018 21:27)    PROCEDURE: 10/5 s/p right craniotomy for brain tumor resection; POD#3      PLAN:   -Continue Decadron for cerebral edema; will taper to 4q8 today  -Continue Keppra 500 q12 for history of seizures-patient was switched from Dilantin 100mg qHS that he was previously taking at home per neurology  -Percocet PRN for pain control  -Continue Lisinopril 20mg daily for hx of HTN  -Continue Lantus 24 qHS, Humalog 8units TID AC, and low dose HISS for hx of T2DM and steroid induced hyperglycemia  -Leukocytosis improving - likely secondary to steroids; patient afebrile. Will trend WBC.   -Continue colace, senna for bowel regimen. Will add miralax today.  -Encouraged mobilization  -Inventive spirometer  -DVT prophylaxis: SQL, venodynes  -Dispo: no PT needs  -Possible discharge home today  -Will discuss with Dr. Shiraz Farr # [id number]

## 2018-10-08 NOTE — DISCHARGE NOTE ADULT - HOSPITAL COURSE
Patient is a 48 year old male visiting from [facility] who has a past medical history of diabetes, HTN, HLD, and seizures diagnosed in 2007 (reports only having 1 episode). He initially presented after a fall/?seizure in [facility] while he was out with his family and was found to have a right frontal brain tumor. CT chest/abdomen/pelvis was done to rule out metastatic disease and was negative for primary lesion. On 10/5/18, the patient underwent a right craniotomy for resection of right frontal brain tumor. Post operative imaging revealed expected post operative changes. He was evaluated by neurology for management of antiepileptic medications and they recommending switching Dilantin to Keppra. He was evaluated by physical therapy who recommended no PT needs. On the day of discharge, he is medically and neurosurgically stable and cleared for discharge home. 48 year old male visiting from [facility] who has a past medical history of diabetes, HTN, HLD, and seizures diagnosed in 2007 (reports only having 1 episode). He initially presented after a fall/?seizure in [facility] while he was out with his family on 10/2/18  and was found to have a right frontal brain tumor. CT chest/abdomen/pelvis was done to rule out metastatic disease and was negative for primary lesion.  Imaging reveals Right frontal brain mass 3.1cm x 2.6x 2.2cm lesion On 10/5/18, the patient underwent a right craniotomy for resection of right frontal brain tumor. Post operative imaging revealed expected post operative changes. He was evaluated by neurology for management of antiepileptic medications and they recommending switching Dilantin to Keppra.  Hospital  course c/b uncontrolled  hyperglycemia related to steroids. Seen by endocrine 10/9/18 . He was evaluated by physical therapy who recommended no PT needs. On the day of discharge, he is medically and neurosurgically stable and cleared for discharge home.  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 48 year old male visiting from [facility] who has a past medical history of diabetes, HTN, HLD, and seizures diagnosed in 2007 (reports only having 1 episode). He initially presented after a fall/?seizure in [facility] while he was out with his family on 10/2/18  and was found to have a right frontal brain tumor. CT chest/abdomen/pelvis was done to rule out metastatic disease and was negative for primary lesion.  Imaging reveals Right frontal brain mass 3.1cm x 2.6x 2.2cm lesion On 10/5/18, the patient underwent a right craniotomy for resection of right frontal brain tumor. Post operative imaging revealed expected post operative changes. He was evaluated by neurology for management of antiepileptic medications and they recommending switching Dilantin to Keppra.  Hospital  course c/b uncontrolled  hyperglycemia related to steroids. Seen by endocrine 10/9/18 . He was evaluated by physical therapy who recommended no PT needs. On the day of discharge, he is medically and neurosurgically stable and cleared for discharge home.  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 Discharged with home care

## 2018-10-09 DIAGNOSIS — I10 ESSENTIAL (PRIMARY) HYPERTENSION: ICD-10-CM

## 2018-10-09 DIAGNOSIS — E11.9 TYPE 2 DIABETES MELLITUS WITHOUT COMPLICATIONS: ICD-10-CM

## 2018-10-09 DIAGNOSIS — E78.2 MIXED HYPERLIPIDEMIA: ICD-10-CM

## 2018-10-09 LAB
ANION GAP SERPL CALC-SCNC: 13 MMOL/L — SIGNIFICANT CHANGE UP (ref 5–17)
BUN SERPL-MCNC: 24 MG/DL — HIGH (ref 7–23)
CALCIUM SERPL-MCNC: 9.4 MG/DL — SIGNIFICANT CHANGE UP (ref 8.4–10.5)
CHLORIDE SERPL-SCNC: 99 MMOL/L — SIGNIFICANT CHANGE UP (ref 96–108)
CO2 SERPL-SCNC: 21 MMOL/L — LOW (ref 22–31)
CREAT SERPL-MCNC: 0.6 MG/DL — SIGNIFICANT CHANGE UP (ref 0.5–1.3)
GLUCOSE BLDC GLUCOMTR-MCNC: 192 MG/DL — HIGH (ref 70–99)
GLUCOSE BLDC GLUCOMTR-MCNC: 237 MG/DL — HIGH (ref 70–99)
GLUCOSE BLDC GLUCOMTR-MCNC: 274 MG/DL — HIGH (ref 70–99)
GLUCOSE BLDC GLUCOMTR-MCNC: 282 MG/DL — HIGH (ref 70–99)
GLUCOSE SERPL-MCNC: 265 MG/DL — HIGH (ref 70–99)
HCT VFR BLD CALC: 42.7 % — SIGNIFICANT CHANGE UP (ref 39–50)
HGB BLD-MCNC: 13.5 G/DL — SIGNIFICANT CHANGE UP (ref 13–17)
MCHC RBC-ENTMCNC: 25.5 PG — LOW (ref 27–34)
MCHC RBC-ENTMCNC: 31.5 GM/DL — LOW (ref 32–36)
MCV RBC AUTO: 81 FL — SIGNIFICANT CHANGE UP (ref 80–100)
PLATELET # BLD AUTO: 282 K/UL — SIGNIFICANT CHANGE UP (ref 150–400)
POTASSIUM SERPL-MCNC: 4.9 MMOL/L — SIGNIFICANT CHANGE UP (ref 3.5–5.3)
POTASSIUM SERPL-SCNC: 4.9 MMOL/L — SIGNIFICANT CHANGE UP (ref 3.5–5.3)
RBC # BLD: 5.27 M/UL — SIGNIFICANT CHANGE UP (ref 4.2–5.8)
RBC # FLD: 12.7 % — SIGNIFICANT CHANGE UP (ref 10.3–14.5)
SODIUM SERPL-SCNC: 133 MMOL/L — LOW (ref 135–145)
SURGICAL PATHOLOGY STUDY: SIGNIFICANT CHANGE UP
WBC # BLD: 19.4 K/UL — HIGH (ref 3.8–10.5)
WBC # FLD AUTO: 19.4 K/UL — HIGH (ref 3.8–10.5)

## 2018-10-09 PROCEDURE — 99223 1ST HOSP IP/OBS HIGH 75: CPT

## 2018-10-09 RX ORDER — DEXAMETHASONE 0.5 MG/5ML
4 ELIXIR ORAL EVERY 12 HOURS
Qty: 0 | Refills: 0 | Status: DISCONTINUED | OUTPATIENT
Start: 2018-10-09 | End: 2018-10-10

## 2018-10-09 RX ORDER — INSULIN LISPRO 100/ML
12 VIAL (ML) SUBCUTANEOUS
Qty: 0 | Refills: 0 | Status: DISCONTINUED | OUTPATIENT
Start: 2018-10-09 | End: 2018-10-10

## 2018-10-09 RX ORDER — INSULIN GLARGINE 100 [IU]/ML
28 INJECTION, SOLUTION SUBCUTANEOUS AT BEDTIME
Qty: 0 | Refills: 0 | Status: DISCONTINUED | OUTPATIENT
Start: 2018-10-09 | End: 2018-10-10

## 2018-10-09 RX ADMIN — Medication 2: at 17:48

## 2018-10-09 RX ADMIN — POLYETHYLENE GLYCOL 3350 17 GRAM(S): 17 POWDER, FOR SOLUTION ORAL at 05:37

## 2018-10-09 RX ADMIN — POLYETHYLENE GLYCOL 3350 17 GRAM(S): 17 POWDER, FOR SOLUTION ORAL at 17:39

## 2018-10-09 RX ADMIN — Medication 100 MILLIGRAM(S): at 21:09

## 2018-10-09 RX ADMIN — Medication 8 UNIT(S): at 17:49

## 2018-10-09 RX ADMIN — Medication 6: at 13:00

## 2018-10-09 RX ADMIN — LEVETIRACETAM 500 MILLIGRAM(S): 250 TABLET, FILM COATED ORAL at 17:39

## 2018-10-09 RX ADMIN — ENOXAPARIN SODIUM 40 MILLIGRAM(S): 100 INJECTION SUBCUTANEOUS at 17:39

## 2018-10-09 RX ADMIN — Medication 4 MILLIGRAM(S): at 17:39

## 2018-10-09 RX ADMIN — INSULIN GLARGINE 28 UNIT(S): 100 INJECTION, SOLUTION SUBCUTANEOUS at 22:20

## 2018-10-09 RX ADMIN — LEVETIRACETAM 500 MILLIGRAM(S): 250 TABLET, FILM COATED ORAL at 05:35

## 2018-10-09 RX ADMIN — Medication 8 UNIT(S): at 13:01

## 2018-10-09 RX ADMIN — Medication 100 MILLIGRAM(S): at 13:01

## 2018-10-09 RX ADMIN — Medication 4: at 09:02

## 2018-10-09 RX ADMIN — LISINOPRIL 20 MILLIGRAM(S): 2.5 TABLET ORAL at 05:36

## 2018-10-09 RX ADMIN — Medication 6: at 22:05

## 2018-10-09 RX ADMIN — Medication 4 MILLIGRAM(S): at 05:35

## 2018-10-09 RX ADMIN — Medication 8 UNIT(S): at 09:03

## 2018-10-09 RX ADMIN — SENNA PLUS 2 TABLET(S): 8.6 TABLET ORAL at 21:09

## 2018-10-09 RX ADMIN — Medication 100 MILLIGRAM(S): at 05:35

## 2018-10-09 NOTE — CONSULT NOTE ADULT - ASSESSMENT
47 yo [place] male with T2DM uncontrolled with no known complications since 2007 here s/p episode of syncope associated with l.o.c while out shopping with his brother. Endocrine consulted for steroid-induced hyperglycemia. High-decision making.

## 2018-10-09 NOTE — CONSULT NOTE ADULT - PROBLEM SELECTOR RECOMMENDATION 9
-BS elevated in the setting of high-dose steroid use. Adjust insulin as thus: Lantus 28 units sq qhs and humalog 12 units tid-ac  -moderate correctional scale    DISPO: pt is a visitor, so he can f/u at medicine clinic at [address] [place] [address]   -Asked patient to get the name/bottle of his second agent as he is at maximal dose of metformin -BS elevated in the setting of high-dose steroid use. Adjust insulin as thus: Lantus 28 units sq qhs and humalog 12 units tid-ac  -moderate correctional scale    DISPO: pt is a visitor, so he can f/u at medicine clinic at [address] [place] [address]   -Asked patient to get the name/bottle of his second agent as he is at maximal dose of metformin  -Awaiting final steroid taper

## 2018-10-09 NOTE — PROGRESS NOTE ADULT - SUBJECTIVE AND OBJECTIVE BOX
SUBJECTIVE:   No complaints . sleeping, easily arousable   OVERNIGHT EVENTS: none    Vital Signs Last 24 Hrs  T(C): 36.7 (09 Oct 2018 07:34), Max: 36.9 (09 Oct 2018 00:22)  T(F): 98.1 (09 Oct 2018 07:34), Max: 98.5 (09 Oct 2018 05:14)  HR: 73 (09 Oct 2018 07:34) (73 - 88)  BP: 109/70 (09 Oct 2018 07:34) (105/67 - 125/82)  BP(mean): --  RR: 18 (09 Oct 2018 07:34) (17 - 18)  SpO2: 98% (09 Oct 2018 07:34) (96% - 99%)    PHYSICAL EXAM:    Constitutional: No Acute Distress     Neurological: AOx3, Speech clear Following Commands, JEFFRY EOMI Moving all Extremities 5/5. No drift . Celine incision swelling improving . Right cranial incision staples C/D/I       Pulmonary: Clear to Auscultation, No rales, No rhonchi, No wheezes     Cardiovascular: S1, S2, Regular rate and rhythm     Gastrointestinal: Soft, Non-tender, Non-distended     Extremities: No calf tenderness       LABS:                        13.5   19.4  )-----------( 282      ( 09 Oct 2018 06:50 )             42.7    10-09    133<L>  |  99  |  24<H>  ----------------------------<  265<H>  4.9   |  21<L>  |  0.60    Ca    9.4      09 Oct 2018 06:50            IMAGING:         MEDICATIONS:    levETIRAcetam 500 milliGRAM(s) Oral two times a day  oxyCODONE    5 mG/acetaminophen 325 mG 1 Tablet(s) Oral every 4 hours PRN Moderate Pain (4 - 6)  lisinopril 20 milliGRAM(s) Oral daily  docusate sodium 100 milliGRAM(s) Oral three times a day  polyethylene glycol 3350 17 Gram(s) Oral two times a day  senna 2 Tablet(s) Oral at bedtime  dexamethasone     Tablet 4 milliGRAM(s) Oral every 12 hours  enoxaparin Injectable 40 milliGRAM(s) SubCutaneous <User Schedule>  insulin glargine Injectable (LANTUS) 24 Unit(s) SubCutaneous at bedtime  insulin lispro (HumaLOG) corrective regimen sliding scale   SubCutaneous Before meals and at bedtime  insulin lispro Injectable (HumaLOG) 8 Unit(s) SubCutaneous three times a day before meals      DIET:

## 2018-10-09 NOTE — CONSULT NOTE ADULT - SUBJECTIVE AND OBJECTIVE BOX
HPI:  47 yo M w/ PMHX of diabetes, HTN, HLD, and seizures diagnosed in 2007 (reports only having 1 episode) at the same time as his diabetes. Presents today after a fall today at [facility] while out with his family. Pt reports feeling lightheaded, falling, and losing consciousness for about 5-10 minutes and woke up confused and sweaty as per family. The fall happened 1 hour ago. Prior to the fall pt admits to having muscle pains in legs BL, severe HA which was accompanied by dizziness and blurry vision. Denies any CP, nausea, SOB, or hx of heart attacks. Finger stick done by EMS was 120. (02 Oct 2018 21:27)      PAST MEDICAL & SURGICAL HISTORY:      FAMILY HISTORY:      Social History:  Tobacco  ETOH  Illicits  Occupation    Outpatient Medications:    MEDICATIONS  (STANDING):  dexamethasone     Tablet 4 milliGRAM(s) Oral every 12 hours  docusate sodium 100 milliGRAM(s) Oral three times a day  enoxaparin Injectable 40 milliGRAM(s) SubCutaneous <User Schedule>  insulin glargine Injectable (LANTUS) 24 Unit(s) SubCutaneous at bedtime  insulin lispro (HumaLOG) corrective regimen sliding scale   SubCutaneous Before meals and at bedtime  insulin lispro Injectable (HumaLOG) 8 Unit(s) SubCutaneous three times a day before meals  levETIRAcetam 500 milliGRAM(s) Oral two times a day  lisinopril 20 milliGRAM(s) Oral daily  polyethylene glycol 3350 17 Gram(s) Oral two times a day  senna 2 Tablet(s) Oral at bedtime    MEDICATIONS  (PRN):  acetaminophen   Tablet .. 650 milliGRAM(s) Oral every 6 hours PRN Temp greater or equal to 38C (100.4F), Mild Pain (1 - 3)  oxyCODONE    5 mG/acetaminophen 325 mG 1 Tablet(s) Oral every 4 hours PRN Moderate Pain (4 - 6)      Allergies    No Known Allergies    Intolerances      Review of Systems:  Constitutional: No fever, good appetite/po intake  Eyes: No blurry vision, diplopia  Neuro: No tremors  HEENT: No pain  Cardiovascular: No chest pain, palpitations  Respiratory: No SOB, no cough  GI: No nausea, vomiting,   : No dysuria, hematuria  Skin: no rash  Psych: no depression  Endocrine: no polyuria, polydipsia  Hem/lymph: no swelling  Osteoporosis: no fractures    ALL OTHER SYSTEMS REVIEWED AND NEGATIVE    UNABLE TO OBTAIN    PHYSICAL EXAM:  VITALS: T(C): 36.9 (10-09-18 @ 15:37)  T(F): 98.4 (10-09-18 @ 15:37), Max: 98.5 (10-09-18 @ 05:14)  HR: 84 (10-09-18 @ 15:37) (73 - 88)  BP: 136/85 (10-09-18 @ 15:37) (105/67 - 136/85)  RR:  (17 - 18)  SpO2:  (96% - 100%)  Wt(kg): --  GENERAL: NAD, well-groomed, well-developed  EYES: No proptosis, no lid lag, anicteric  HEENT:  Atraumatic, Normocephalic, moist mucous membranes  THYROID: Normal size, no palpable nodules  RESPIRATORY: Clear to auscultation bilaterally; No rales, rhonchi, wheezing, or rubs  CARDIOVASCULAR: Regular rate and rhythm; No murmurs; no peripheral edema  GI: Soft, nontender, non distended, normal bowel sounds  SKIN: Dry, intact, No rashes or lesions  NEURO: sensation intact, extraocular movements intact, no tremor, normal reflexes  PSYCH: reactive affect, euthymic mood  CUSHING'S SIGNS: no striae    POCT Blood Glucose.: 192 mg/dL (10-09-18 @ 17:07)  POCT Blood Glucose.: 282 mg/dL (10-09-18 @ 12:48)  POCT Blood Glucose.: 237 mg/dL (10-09-18 @ 08:39)  POCT Blood Glucose.: 338 mg/dL (10-08-18 @ 21:33)  POCT Blood Glucose.: 147 mg/dL (10-08-18 @ 17:27)  POCT Blood Glucose.: 206 mg/dL (10-08-18 @ 12:32)  POCT Blood Glucose.: 220 mg/dL (10-08-18 @ 08:41)  POCT Blood Glucose.: 326 mg/dL (10-07-18 @ 21:13)  POCT Blood Glucose.: 260 mg/dL (10-07-18 @ 17:14)  POCT Blood Glucose.: 192 mg/dL (10-07-18 @ 13:02)  POCT Blood Glucose.: 216 mg/dL (10-07-18 @ 08:49)  POCT Blood Glucose.: 276 mg/dL (10-06-18 @ 21:10)                            13.5   19.4  )-----------( 282      ( 09 Oct 2018 06:50 )             42.7       10-09    133<L>  |  99  |  24<H>  ----------------------------<  265<H>  4.9   |  21<L>  |  0.60    EGFR if : 138  EGFR if non : 119    Ca    9.4      10-09        Thyroid Function Tests:      Hemoglobin A1C, Whole Blood: 8.0 % <H> [4.0 - 5.6] (10-04-18 @ 09:32)          Radiology:     A/P: yo male/female with hx of ................................... here with .......... HPI: 47 yo male with T2DM uncontrolled (HbA1c 8%) with no known complications since 2007 here s/p episode of syncope associated with l.o.c while out shopping with his brother. The patient was diagnosed with a R frontal tumor s/p resection for low grade ganglioma. The patient was aware of the tumor as it was diagnosed back in [facility]. He was told that it simply needed to be monitored with regular MRIs. The last one was about a year ago.   He has had T2DM x 11 years. He takes metformin 1000mg po bid as well as another tablet which he says sounds like "Dianall", which I was unable to find. he tests his bs 1-2x/day. AM bs are 120s and PM bs are 140s. +hypoglycemia about once weekly. He denies that it occurs when he drinks. He will drink 7 12oz beers in one sitting once weekly.     PAST MEDICAL & SURGICAL HISTORY:  Seizures  Brain Tumor  T2DM    FAMILY HISTORY: DM: mother, no brain tumors    Social History:  Tobacco: 1/2 pdd x 17 years, has tried to quit  ETOH: 7 beers in one sitting once weekly   Occupation: [employer]  Lives in [facility]    Outpatient Medications: metformin 1000mg po bid and another tablet    MEDICATIONS  (STANDING):  dexamethasone     Tablet 4 milliGRAM(s) Oral every 12 hours  docusate sodium 100 milliGRAM(s) Oral three times a day  enoxaparin Injectable 40 milliGRAM(s) SubCutaneous <User Schedule>  insulin glargine Injectable (LANTUS) 24 Unit(s) SubCutaneous at bedtime  insulin lispro (HumaLOG) corrective regimen sliding scale   SubCutaneous Before meals and at bedtime  insulin lispro Injectable (HumaLOG) 8 Unit(s) SubCutaneous three times a day before meals  levETIRAcetam 500 milliGRAM(s) Oral two times a day  lisinopril 20 milliGRAM(s) Oral daily  polyethylene glycol 3350 17 Gram(s) Oral two times a day  senna 2 Tablet(s) Oral at bedtime    MEDICATIONS  (PRN):  acetaminophen   Tablet .. 650 milliGRAM(s) Oral every 6 hours PRN Temp greater or equal to 38C (100.4F), Mild Pain (1 - 3)  oxyCODONE    5 mG/acetaminophen 325 mG 1 Tablet(s) Oral every 4 hours PRN Moderate Pain (4 - 6)      Allergies  No Known Allergies      Review of Systems:  Constitutional: No fever/chills  Eyes: No blurry vision, diplopia  Neuro: +post-op headache, no neuropathy  HEENT: No dysphagia/dysponia  Cardiovascular: No chest pain, palpitations  Respiratory: No SOB, no cough  GI: No nausea, vomiting,   : No dysuria, hematuria  ALL OTHER SYSTEMS REVIEWED AND NEGATIVE      PHYSICAL EXAM:  VITALS: T(C): 36.9 (10-09-18 @ 15:37)  T(F): 98.4 (10-09-18 @ 15:37), Max: 98.5 (10-09-18 @ 05:14)  HR: 84 (10-09-18 @ 15:37) (73 - 88)  BP: 136/85 (10-09-18 @ 15:37) (105/67 - 136/85)  RR:  (17 - 18)  SpO2:  (96% - 100%)  Wt(kg): --  GENERAL: NAD, well-groomed, well-developed  EYES: No proptosis, anicteric  HEENT:  Atraumatic, Normocephalic, moist mucous membranes  THYROID: Normal size, no palpable nodules  RESPIRATORY: Clear to auscultation bilaterally; No rales, rhonchi, wheezing, or rubs  CARDIOVASCULAR: Regular rate and rhythm; No murmurs; no peripheral edema  GI: Soft, nontender, non distended, normal bowel sounds  SKIN: Dry, intact, No rashes or lesions on feet b/l  PSYCH: reactive affect, euthymic mood      POCT Blood Glucose.: 192 mg/dL (10-09-18 @ 17:07)  POCT Blood Glucose.: 282 mg/dL (10-09-18 @ 12:48)  POCT Blood Glucose.: 237 mg/dL (10-09-18 @ 08:39)  POCT Blood Glucose.: 338 mg/dL (10-08-18 @ 21:33)  POCT Blood Glucose.: 147 mg/dL (10-08-18 @ 17:27)  POCT Blood Glucose.: 206 mg/dL (10-08-18 @ 12:32)  POCT Blood Glucose.: 220 mg/dL (10-08-18 @ 08:41)  POCT Blood Glucose.: 326 mg/dL (10-07-18 @ 21:13)  POCT Blood Glucose.: 260 mg/dL (10-07-18 @ 17:14)  POCT Blood Glucose.: 192 mg/dL (10-07-18 @ 13:02)  POCT Blood Glucose.: 216 mg/dL (10-07-18 @ 08:49)  POCT Blood Glucose.: 276 mg/dL (10-06-18 @ 21:10)                            13.5   19.4  )-----------( 282      ( 09 Oct 2018 06:50 )             42.7       10-09    133<L>  |  99  |  24<H>  ----------------------------<  265<H>  4.9   |  21<L>  |  0.60    EGFR if : 138  EGFR if non : 119    Ca    9.4      10-09          Hemoglobin A1C, Whole Blood: 8.0 % <H> [4.0 - 5.6] (10-04-18 @ 09:32)

## 2018-10-09 NOTE — PROGRESS NOTE ADULT - ASSESSMENT
48 yr old male h/o DM HTN HLD , h/o seizures since 2007 admitted 10/2 after seizures; found with right frontal brain tumor; Imaging reveals Right frontal 3.1cm x 2.6x 2.2cm lesion   s/p right frontal temporal craniotomy and gross total removal of right frontal brain tumor 10/5/18 . Pathology-pending.  course c/b uncontrolled  hyperglycemia related to steroids      Plan  Neuro. On Keppra for seizures. Follow up pathology results. Will set up neuron follow up appt prior to discharge . Decadron tapered to 4mg q12  Vitals stable- On Lisnopril  Type 2 DM- FSBS 230-280. On lantus 24u and Humalog 8u premeal . Undocumented uninsured, Endocrine consult for discharge recs . (On Metformin at home)  DVT ppx  leukocytosis likely related to steroids   SW following  PT- No needs.  D/c home once endocrine recs obtained

## 2018-10-10 VITALS
RESPIRATION RATE: 17 BRPM | OXYGEN SATURATION: 100 % | HEART RATE: 77 BPM | SYSTOLIC BLOOD PRESSURE: 108 MMHG | DIASTOLIC BLOOD PRESSURE: 69 MMHG | TEMPERATURE: 98 F

## 2018-10-10 LAB
ANION GAP SERPL CALC-SCNC: 12 MMOL/L — SIGNIFICANT CHANGE UP (ref 5–17)
BUN SERPL-MCNC: 19 MG/DL — SIGNIFICANT CHANGE UP (ref 7–23)
CALCIUM SERPL-MCNC: 9.3 MG/DL — SIGNIFICANT CHANGE UP (ref 8.4–10.5)
CHLORIDE SERPL-SCNC: 99 MMOL/L — SIGNIFICANT CHANGE UP (ref 96–108)
CO2 SERPL-SCNC: 23 MMOL/L — SIGNIFICANT CHANGE UP (ref 22–31)
CREAT SERPL-MCNC: 0.57 MG/DL — SIGNIFICANT CHANGE UP (ref 0.5–1.3)
GLUCOSE BLDC GLUCOMTR-MCNC: 129 MG/DL — HIGH (ref 70–99)
GLUCOSE BLDC GLUCOMTR-MCNC: 188 MG/DL — HIGH (ref 70–99)
GLUCOSE BLDC GLUCOMTR-MCNC: 217 MG/DL — HIGH (ref 70–99)
GLUCOSE SERPL-MCNC: 170 MG/DL — HIGH (ref 70–99)
HCT VFR BLD CALC: 41.4 % — SIGNIFICANT CHANGE UP (ref 39–50)
HGB BLD-MCNC: 13.5 G/DL — SIGNIFICANT CHANGE UP (ref 13–17)
MCHC RBC-ENTMCNC: 26.1 PG — LOW (ref 27–34)
MCHC RBC-ENTMCNC: 32.6 GM/DL — SIGNIFICANT CHANGE UP (ref 32–36)
MCV RBC AUTO: 80 FL — SIGNIFICANT CHANGE UP (ref 80–100)
PLATELET # BLD AUTO: 317 K/UL — SIGNIFICANT CHANGE UP (ref 150–400)
POTASSIUM SERPL-MCNC: 4.4 MMOL/L — SIGNIFICANT CHANGE UP (ref 3.5–5.3)
POTASSIUM SERPL-SCNC: 4.4 MMOL/L — SIGNIFICANT CHANGE UP (ref 3.5–5.3)
RBC # BLD: 5.18 M/UL — SIGNIFICANT CHANGE UP (ref 4.2–5.8)
RBC # FLD: 12.1 % — SIGNIFICANT CHANGE UP (ref 10.3–14.5)
SODIUM SERPL-SCNC: 134 MMOL/L — LOW (ref 135–145)
WBC # BLD: 18.7 K/UL — HIGH (ref 3.8–10.5)
WBC # FLD AUTO: 18.7 K/UL — HIGH (ref 3.8–10.5)

## 2018-10-10 PROCEDURE — 88342 IMHCHEM/IMCYTCHM 1ST ANTB: CPT

## 2018-10-10 PROCEDURE — 86850 RBC ANTIBODY SCREEN: CPT

## 2018-10-10 PROCEDURE — 84295 ASSAY OF SERUM SODIUM: CPT

## 2018-10-10 PROCEDURE — 85014 HEMATOCRIT: CPT

## 2018-10-10 PROCEDURE — 82553 CREATINE MB FRACTION: CPT

## 2018-10-10 PROCEDURE — 88300 SURGICAL PATH GROSS: CPT

## 2018-10-10 PROCEDURE — 82947 ASSAY GLUCOSE BLOOD QUANT: CPT

## 2018-10-10 PROCEDURE — 82330 ASSAY OF CALCIUM: CPT

## 2018-10-10 PROCEDURE — 86900 BLOOD TYPING SEROLOGIC ABO: CPT

## 2018-10-10 PROCEDURE — 97165 OT EVAL LOW COMPLEX 30 MIN: CPT

## 2018-10-10 PROCEDURE — 99285 EMERGENCY DEPT VISIT HI MDM: CPT

## 2018-10-10 PROCEDURE — 84132 ASSAY OF SERUM POTASSIUM: CPT

## 2018-10-10 PROCEDURE — 97161 PT EVAL LOW COMPLEX 20 MIN: CPT

## 2018-10-10 PROCEDURE — 80048 BASIC METABOLIC PNL TOTAL CA: CPT

## 2018-10-10 PROCEDURE — 83880 ASSAY OF NATRIURETIC PEPTIDE: CPT

## 2018-10-10 PROCEDURE — 85027 COMPLETE CBC AUTOMATED: CPT

## 2018-10-10 PROCEDURE — 83735 ASSAY OF MAGNESIUM: CPT

## 2018-10-10 PROCEDURE — 88331 PATH CONSLTJ SURG 1 BLK 1SPC: CPT

## 2018-10-10 PROCEDURE — 88307 TISSUE EXAM BY PATHOLOGIST: CPT

## 2018-10-10 PROCEDURE — 74177 CT ABD & PELVIS W/CONTRAST: CPT

## 2018-10-10 PROCEDURE — 82962 GLUCOSE BLOOD TEST: CPT

## 2018-10-10 PROCEDURE — 85730 THROMBOPLASTIN TIME PARTIAL: CPT

## 2018-10-10 PROCEDURE — 82803 BLOOD GASES ANY COMBINATION: CPT

## 2018-10-10 PROCEDURE — 93005 ELECTROCARDIOGRAM TRACING: CPT | Mod: 76

## 2018-10-10 PROCEDURE — 88334 PATH CONSLTJ SURG CYTO XM EA: CPT

## 2018-10-10 PROCEDURE — 82550 ASSAY OF CK (CPK): CPT

## 2018-10-10 PROCEDURE — 70496 CT ANGIOGRAPHY HEAD: CPT

## 2018-10-10 PROCEDURE — 85610 PROTHROMBIN TIME: CPT

## 2018-10-10 PROCEDURE — 70450 CT HEAD/BRAIN W/O DYE: CPT

## 2018-10-10 PROCEDURE — 84484 ASSAY OF TROPONIN QUANT: CPT

## 2018-10-10 PROCEDURE — 88341 IMHCHEM/IMCYTCHM EA ADD ANTB: CPT

## 2018-10-10 PROCEDURE — 99233 SBSQ HOSP IP/OBS HIGH 50: CPT

## 2018-10-10 PROCEDURE — 83036 HEMOGLOBIN GLYCOSYLATED A1C: CPT

## 2018-10-10 PROCEDURE — 71045 X-RAY EXAM CHEST 1 VIEW: CPT

## 2018-10-10 PROCEDURE — A9585: CPT

## 2018-10-10 PROCEDURE — 82435 ASSAY OF BLOOD CHLORIDE: CPT

## 2018-10-10 PROCEDURE — C1769: CPT

## 2018-10-10 PROCEDURE — C1713: CPT

## 2018-10-10 PROCEDURE — 70498 CT ANGIOGRAPHY NECK: CPT

## 2018-10-10 PROCEDURE — 83605 ASSAY OF LACTIC ACID: CPT

## 2018-10-10 PROCEDURE — C1889: CPT

## 2018-10-10 PROCEDURE — 71260 CT THORAX DX C+: CPT

## 2018-10-10 PROCEDURE — 70553 MRI BRAIN STEM W/O & W/DYE: CPT

## 2018-10-10 PROCEDURE — 80053 COMPREHEN METABOLIC PANEL: CPT

## 2018-10-10 PROCEDURE — 86901 BLOOD TYPING SEROLOGIC RH(D): CPT

## 2018-10-10 PROCEDURE — 84100 ASSAY OF PHOSPHORUS: CPT

## 2018-10-10 PROCEDURE — 93306 TTE W/DOPPLER COMPLETE: CPT

## 2018-10-10 PROCEDURE — 88360 TUMOR IMMUNOHISTOCHEM/MANUAL: CPT

## 2018-10-10 RX ORDER — LEVETIRACETAM 250 MG/1
1 TABLET, FILM COATED ORAL
Qty: 180 | Refills: 0 | OUTPATIENT
Start: 2018-10-10 | End: 2019-01-07

## 2018-10-10 RX ORDER — INSULIN NPH HUM/REG INSULIN HM 70-30/ML
10 VIAL (ML) SUBCUTANEOUS
Qty: 3 | Refills: 0 | OUTPATIENT
Start: 2018-10-10

## 2018-10-10 RX ORDER — ACETAMINOPHEN 500 MG
2 TABLET ORAL
Qty: 0 | Refills: 0 | COMMUNITY
Start: 2018-10-10

## 2018-10-10 RX ORDER — INSULIN NPH HUM/REG INSULIN HM 70-30/ML
15 VIAL (ML) SUBCUTANEOUS
Qty: 3 | Refills: 0 | OUTPATIENT
Start: 2018-10-10

## 2018-10-10 RX ORDER — METFORMIN HYDROCHLORIDE 850 MG/1
1 TABLET ORAL
Qty: 180 | Refills: 0 | OUTPATIENT
Start: 2018-10-10 | End: 2019-01-07

## 2018-10-10 RX ORDER — DEXAMETHASONE 0.5 MG/5ML
1 ELIXIR ORAL
Qty: 30 | Refills: 0 | OUTPATIENT
Start: 2018-10-10

## 2018-10-10 RX ORDER — LISINOPRIL 2.5 MG/1
1 TABLET ORAL
Qty: 90 | Refills: 0 | OUTPATIENT
Start: 2018-10-10

## 2018-10-10 RX ORDER — DEXAMETHASONE 0.5 MG/5ML
1 ELIXIR ORAL
Qty: 0 | Refills: 0 | COMMUNITY
Start: 2018-10-10

## 2018-10-10 RX ADMIN — Medication 4: at 13:16

## 2018-10-10 RX ADMIN — LEVETIRACETAM 500 MILLIGRAM(S): 250 TABLET, FILM COATED ORAL at 05:13

## 2018-10-10 RX ADMIN — LEVETIRACETAM 500 MILLIGRAM(S): 250 TABLET, FILM COATED ORAL at 17:32

## 2018-10-10 RX ADMIN — Medication 2: at 09:13

## 2018-10-10 RX ADMIN — POLYETHYLENE GLYCOL 3350 17 GRAM(S): 17 POWDER, FOR SOLUTION ORAL at 05:13

## 2018-10-10 RX ADMIN — Medication 100 MILLIGRAM(S): at 13:17

## 2018-10-10 RX ADMIN — POLYETHYLENE GLYCOL 3350 17 GRAM(S): 17 POWDER, FOR SOLUTION ORAL at 17:32

## 2018-10-10 RX ADMIN — Medication 100 MILLIGRAM(S): at 05:13

## 2018-10-10 RX ADMIN — Medication 12 UNIT(S): at 09:13

## 2018-10-10 RX ADMIN — LISINOPRIL 20 MILLIGRAM(S): 2.5 TABLET ORAL at 05:13

## 2018-10-10 RX ADMIN — Medication 4 MILLIGRAM(S): at 05:13

## 2018-10-10 RX ADMIN — Medication 4 MILLIGRAM(S): at 17:32

## 2018-10-10 RX ADMIN — Medication 12 UNIT(S): at 18:20

## 2018-10-10 RX ADMIN — Medication 12 UNIT(S): at 13:15

## 2018-10-10 RX ADMIN — ENOXAPARIN SODIUM 40 MILLIGRAM(S): 100 INJECTION SUBCUTANEOUS at 17:32

## 2018-10-10 NOTE — PROGRESS NOTE ADULT - PROBLEM SELECTOR PLAN 1
DISCHARGE:  -test BG ac and hs  -Since pt has no insurance will discharge on Humulin 70/30 insulin based on Decadron taper as follows:  Humulin 70/30 15 units before breakfast and 10 units before dinner on 10/11 and 10/12 and while on Decadron 2mg bid Once on Decadron 2mg once a day continue Humulin 70/30 insulin 15 units in am and discontinue ac dinner insulin dose.  Pt to stop insulin once he is off decadron. Might benefit from Metformin and a sulfonylurea to improve DM since [place].   -Pt can restart Metformin 1g bid upon discharge.   -Staff teaching use of insulin with syringes> was able to give teach back with help so will need reinforcement of education and pt might qualify for free home visit to reinforce teaching.  -Pt to follow up in medicine clinic [address] [place] [address] [address]   -Plan discussed with pt/team.  Contact info: [phone] (24/7). pager [phone]

## 2018-10-10 NOTE — PROGRESS NOTE ADULT - SUBJECTIVE AND OBJECTIVE BOX
DIABETES FOLLOW UP NOTE: Saw pt earlier today  INTERVAL HX: 49 y/o M smoker of 1/2 PPD plus weekend ETOH binging and w/h/o uncontrolled T2DM and brain tumor followed in his country [facility]. Vis[place] in [place]. 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 Pt is on a decadron taper with rebound hyperglycemia requiring insulin therapy. Per primary team pt going home today on a steroid taper of Dexa 2mg q12 for 10/11 and 10/12 and then Dexa 2mg daily thereafter. Tolerating POs with glycemic control above goal between high 100s and 200s BG levels. [place].        Review of Systems: No complaints   Cardiovascular: No chest pain, palpitations  Respiratory: No SOB, no cough  GI: No nausea, vomiting, abdominal pain  Endocrine: no polyuria, polydipsia or S&Sx of hypoglycemia  Neuro: Denies HA at time of visit.    Allergies    No Known Allergies    Intolerances      MEDICATIONS:  dexamethasone     Tablet 4 milliGRAM(s) Oral every 12 hours  insulin glargine Injectable (LANTUS) 28 Unit(s) SubCutaneous at bedtime  insulin lispro (HumaLOG) corrective regimen sliding scale   SubCutaneous Before meals and at bedtime  insulin lispro Injectable (HumaLOG) 12 Unit(s) SubCutaneous three times a day before meals      PHYSICAL EXAM:  VITALS: T(C): 36.4 (10-10-18 @ 16:17)  T(F): 97.5 (10-10-18 @ 16:17), Max: 98.7 (10-10-18 @ 05:05)  HR: 77 (10-10-18 @ 16:17) (70 - 85)  BP: 108/69 (10-10-18 @ 16:17) (99/68 - 121/68)  RR:  (16 - 18)  SpO2:  (96% - 100%)  Wt(kg): --  GENERAL: Male sitting in chair in NAD  Abdomen: Soft, nontender, non distended  Extremities: Warm, no edema in all 4 exts  NEURO: A&O X3    LABS:  POCT Blood Glucose.: 217 mg/dL (10-10-18 @ 12:46)  POCT Blood Glucose.: 188 mg/dL (10-10-18 @ 08:42)  POCT Blood Glucose.: 274 mg/dL (10-09-18 @ 21:54)  POCT Blood Glucose.: 192 mg/dL (10-09-18 @ 17:07)  POCT Blood Glucose.: 282 mg/dL (10-09-18 @ 12:48)  POCT Blood Glucose.: 237 mg/dL (10-09-18 @ 08:39)  POCT Blood Glucose.: 338 mg/dL (10-08-18 @ 21:33)  POCT Blood Glucose.: 147 mg/dL (10-08-18 @ 17:27)  POCT Blood Glucose.: 206 mg/dL (10-08-18 @ 12:32)  POCT Blood Glucose.: 220 mg/dL (10-08-18 @ 08:41)  POCT Blood Glucose.: 326 mg/dL (10-07-18 @ 21:13)  POCT Blood Glucose.: 260 mg/dL (10-07-18 @ 17:14)                            13.5   18.7  )-----------( 317      ( 10 Oct 2018 05:34 )             41.4       10-10    134<L>  |  99  |  19  ----------------------------<  170<H>  4.4   |  23  |  0.57    EGFR if : 141  EGFR if non : 121    Ca    9.3      10-10      Hemoglobin A1C, Whole Blood: 8.0 % <H> [4.0 - 5.6] (10-04-18 @ 09:32)

## 2018-10-10 NOTE — PROGRESS NOTE ADULT - ASSESSMENT
48 yr old male h/o DM HTN HLD , h/o seizures since 2007 admitted 10/2 after seizures; found with right frontal brain tumor; Imaging reveals Right frontal 3.1cm x 2.6x 2.2cm lesion   s/p right frontal temporal craniotomy and gross total removal of right frontal brain tumor 10/5/18 . Pathology-pending.  course c/b uncontrolled  hyperglycemia related to steroids. Path- Low grade glio neuronal tumor       Plan  Neuro. On Keppra for seizures.  Will set up neuron follow up appt prior to discharge . Decadron taper  Vitals stable- On Lisnopril  Type 2 DM- FSBS 180-200 . Insulin doses Incision evaluation and staple removal by endo yesterday  Undocumented uninsured. To be discharged on Humulin 70/30 and Metformin   DVT ppx  leukocytosis likely related to steroids   SW following  PT- No needs.  D/c home today. Medical clinic appt set 48 yr old male h/o DM HTN HLD , h/o seizures since 2007 admitted 10/2 after seizures; found with right frontal brain tumor; Imaging reveals Right frontal 3.1cm x 2.6x 2.2cm lesion   s/p right frontal temporal craniotomy and gross total removal of right frontal brain tumor 10/5/18  course c/b uncontrolled  hyperglycemia related to steroids. Path- Low grade glio neuronal tumor- ganglioglioma        Plan  Neuro. On Keppra for seizures.  Will set up neuron follow up appt prior to discharge . Decadron taper  Vitals stable- On Lisnopril  Type 2 DM- FSBS 180-200 . Insulin doses Incision evaluation and staple removal by endo yesterday  Undocumented uninsured. To be discharged on Humulin 70/30 and Metformin   DVT ppx  leukocytosis likely related to steroids   SW following  PT- No needs.  D/c home today. Medical clinic appt set

## 2018-10-10 NOTE — PROGRESS NOTE ADULT - REASON FOR ADMISSION
altered mental status
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
altered mental status

## 2018-10-10 NOTE — PROGRESS NOTE ADULT - SUBJECTIVE AND OBJECTIVE BOX
SUBJECTIVE:     OVERNIGHT EVENTS: none    Vital Signs Last 24 Hrs  T(C): 36.4 (10 Oct 2018 12:48), Max: 37.1 (10 Oct 2018 05:05)  T(F): 97.5 (10 Oct 2018 12:48), Max: 98.7 (10 Oct 2018 05:05)  HR: 81 (10 Oct 2018 12:48) (70 - 85)  BP: 101/65 (10 Oct 2018 12:48) (99/68 - 136/85)  BP(mean): --  RR: 18 (10 Oct 2018 12:48) (16 - 18)  SpO2: 98% (10 Oct 2018 12:48) (96% - 100%)    PHYSICAL EXAM:    Neurological: Awake, alert, oriented to person, place and time, speech clear and fluent, face equal, tongue midline, briskly following commands, no drift, moves all extremities with 5/5 strength, sensation intact to light touch throughout, pupils 4mm and reactive bilaterally, extraocular movements intact, no nystagmus    Incision: +right crani staples C/D/I; +right periorbital edema    Pulmonary: Clear to Auscultation, No rales, No rhonchi, No wheezes     Cardiovascular: S1, S2, Regular rate and rhythm     Gastrointestinal: Soft, Non-tender, Non-distended, +bowel sounds x 4    Extremities: No calf tenderness bilaterally, no cyanosis, clubbing or     LABS:                        13.5   18.7  )-----------( 317      ( 10 Oct 2018 05:34 )             41.4    10-10    134<L>  |  99  |  19  ----------------------------<  170<H>  4.4   |  23  |  0.57    Ca    9.3      10 Oct 2018 05:33            IMAGING:         MEDICATIONS:    acetaminophen   Tablet .. 650 milliGRAM(s) Oral every 6 hours PRN Temp greater or equal to 38C (100.4F), Mild Pain (1 - 3)  levETIRAcetam 500 milliGRAM(s) Oral two times a day  oxyCODONE    5 mG/acetaminophen 325 mG 1 Tablet(s) Oral every 4 hours PRN Moderate Pain (4 - 6)  lisinopril 20 milliGRAM(s) Oral daily  docusate sodium 100 milliGRAM(s) Oral three times a day  polyethylene glycol 3350 17 Gram(s) Oral two times a day  senna 2 Tablet(s) Oral at bedtime  dexamethasone     Tablet 4 milliGRAM(s) Oral every 12 hours  enoxaparin Injectable 40 milliGRAM(s) SubCutaneous <User Schedule>  insulin glargine Injectable (LANTUS) 28 Unit(s) SubCutaneous at bedtime  insulin lispro (HumaLOG) corrective regimen sliding scale   SubCutaneous Before meals and at bedtime  insulin lispro Injectable (HumaLOG) 12 Unit(s) SubCutaneous three times a day before meals      DIET:     Consistent carb diet

## 2018-10-10 NOTE — PROGRESS NOTE ADULT - ASSESSMENT
47 y/o M smoker and ETOH binging w/ uncontrolled T2DM and brain tumor. [place] in [place]. 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 Pt is on a decadron taper with rebound hyperglycemia requiring insulin therapy. Per primary team pt going home today on a steroid taper. Tolerating POs with glycemic control above goal. Pt is uninsured. Also pt on Metformin plus another medication he can't recall name. Called mother who stated pt taking Riotase 5mg daily for diabetes. However, Riotase is Ramipril which is a medication for hypertension. Family advised to bring all meds to hospital prior discharge and show then to primary team. Spoke to team who is aware of home meds.   Spent over 30 minutes providing face to face DM education including insulin pep with syringes and discussing plan of care while on steroids.

## 2018-10-15 ENCOUNTER — APPOINTMENT (OUTPATIENT)
Dept: INTERNAL MEDICINE | Facility: CLINIC | Age: 48
End: 2018-10-15

## 2018-10-24 ENCOUNTER — APPOINTMENT (OUTPATIENT)
Dept: NEUROSURGERY | Facility: CLINIC | Age: 48
End: 2018-10-24
Payer: MEDICAID

## 2018-10-24 VITALS
SYSTOLIC BLOOD PRESSURE: 101 MMHG | OXYGEN SATURATION: 98 % | HEIGHT: 65 IN | WEIGHT: 126 LBS | HEART RATE: 85 BPM | RESPIRATION RATE: 16 BRPM | BODY MASS INDEX: 20.99 KG/M2 | DIASTOLIC BLOOD PRESSURE: 69 MMHG | TEMPERATURE: 98.2 F

## 2018-10-24 DIAGNOSIS — Z86.39 PERSONAL HISTORY OF OTHER ENDOCRINE, NUTRITIONAL AND METABOLIC DISEASE: ICD-10-CM

## 2018-10-24 DIAGNOSIS — Z86.69 PERSONAL HISTORY OF OTHER DISEASES OF THE NERVOUS SYSTEM AND SENSE ORGANS: ICD-10-CM

## 2018-10-24 DIAGNOSIS — Z87.891 PERSONAL HISTORY OF NICOTINE DEPENDENCE: ICD-10-CM

## 2018-10-24 DIAGNOSIS — Z78.9 OTHER SPECIFIED HEALTH STATUS: ICD-10-CM

## 2018-10-24 DIAGNOSIS — D49.7 NEOPLASM OF UNSPECIFIED BEHAVIOR OF ENDOCRINE GLANDS AND OTHER PARTS OF NERVOUS SYSTEM: ICD-10-CM

## 2018-10-24 DIAGNOSIS — Z86.79 PERSONAL HISTORY OF OTHER DISEASES OF THE CIRCULATORY SYSTEM: ICD-10-CM

## 2018-10-24 PROCEDURE — 99024 POSTOP FOLLOW-UP VISIT: CPT

## 2018-10-24 RX ORDER — LISINOPRIL 30 MG/1
TABLET ORAL
Refills: 0 | Status: ACTIVE | COMMUNITY

## 2018-10-24 RX ORDER — DEXAMETHASONE 6 MG/1
TABLET ORAL
Refills: 0 | Status: ACTIVE | COMMUNITY

## 2018-10-24 RX ORDER — METFORMIN HYDROCHLORIDE 625 MG/1
TABLET ORAL
Refills: 0 | Status: ACTIVE | COMMUNITY

## 2018-11-01 ENCOUNTER — APPOINTMENT (OUTPATIENT)
Dept: NEUROLOGY | Facility: CLINIC | Age: 48
End: 2018-11-01
Payer: SELF-PAY

## 2018-11-01 ENCOUNTER — APPOINTMENT (OUTPATIENT)
Dept: NEUROLOGY | Facility: CLINIC | Age: 48
End: 2018-11-01

## 2018-11-01 VITALS
SYSTOLIC BLOOD PRESSURE: 98 MMHG | HEIGHT: 65 IN | HEART RATE: 98 BPM | OXYGEN SATURATION: 97 % | WEIGHT: 130 LBS | DIASTOLIC BLOOD PRESSURE: 72 MMHG | RESPIRATION RATE: 16 BRPM | BODY MASS INDEX: 21.66 KG/M2

## 2018-11-01 PROCEDURE — 99245 OFF/OP CONSLTJ NEW/EST HI 55: CPT

## 2018-11-01 RX ORDER — HYDROCHLOROTHIAZIDE 12.5 MG/1
12.5 TABLET ORAL
Refills: 0 | Status: ACTIVE | COMMUNITY
Start: 2018-11-01

## 2018-11-01 RX ORDER — LEVETIRACETAM 500 MG/1
500 TABLET, FILM COATED ORAL
Qty: 60 | Refills: 5 | Status: ACTIVE | COMMUNITY

## 2019-03-11 NOTE — BRIEF OPERATIVE NOTE - PROCEDURE POST
<<-----Click on this checkbox to enter Post-Op Dx
flu 2018  pneumonia immunized 2017  shingles 2018  tetanus 3 years

## 2019-06-19 ENCOUNTER — APPOINTMENT (OUTPATIENT)
Dept: MRI IMAGING | Facility: IMAGING CENTER | Age: 49
End: 2019-06-19

## 2019-06-19 ENCOUNTER — APPOINTMENT (OUTPATIENT)
Dept: NEUROLOGY | Facility: CLINIC | Age: 49
End: 2019-06-19

## 2020-11-17 NOTE — PHYSICAL THERAPY INITIAL EVALUATION ADULT - STANDING BALANCE: STATIC
Attempted to call pt no answer left vm to call office back.  In reference to if he needs glucose meter
[facility]
